# Patient Record
Sex: FEMALE | Race: WHITE | Employment: UNEMPLOYED | URBAN - METROPOLITAN AREA
[De-identification: names, ages, dates, MRNs, and addresses within clinical notes are randomized per-mention and may not be internally consistent; named-entity substitution may affect disease eponyms.]

---
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
## 2023-01-28 ENCOUNTER — HOSPITAL ENCOUNTER (EMERGENCY)
Facility: CLINIC | Age: 18
Discharge: HOME | End: 2023-01-28
Attending: EMERGENCY MEDICINE
Payer: MEDICARE

## 2023-01-28 VITALS
OXYGEN SATURATION: 100 % | BODY MASS INDEX: 17.66 KG/M2 | WEIGHT: 106 LBS | HEIGHT: 65 IN | SYSTOLIC BLOOD PRESSURE: 108 MMHG | TEMPERATURE: 98.6 F | HEART RATE: 77 BPM | DIASTOLIC BLOOD PRESSURE: 71 MMHG | RESPIRATION RATE: 16 BRPM

## 2023-01-28 DIAGNOSIS — J01.00 ACUTE NON-RECURRENT MAXILLARY SINUSITIS: Primary | ICD-10-CM

## 2023-01-28 PROCEDURE — 99283 EMERGENCY DEPT VISIT LOW MDM: CPT

## 2023-01-28 RX ORDER — FLUTICASONE PROPIONATE 50 MCG
1 SPRAY, SUSPENSION (ML) NASAL DAILY
Qty: 16 G | Refills: 0 | Status: SHIPPED | OUTPATIENT
Start: 2023-01-28

## 2023-01-28 RX ORDER — AMOXICILLIN 500 MG/1
500 CAPSULE ORAL 3 TIMES DAILY
Qty: 30 CAPSULE | Refills: 0 | Status: SHIPPED | OUTPATIENT
Start: 2023-01-28 | End: 2023-02-07

## 2023-01-28 ASSESSMENT — ENCOUNTER SYMPTOMS
NAUSEA: 0
TROUBLE SWALLOWING: 0
VOMITING: 0
SINUS PAIN: 1
SINUS PRESSURE: 1
SORE THROAT: 0
DIARRHEA: 0

## 2023-01-28 NOTE — DISCHARGE INSTRUCTIONS
Home.  Antibiotics as prescribed. Use Flonase and over-the-counter decongestant/ cough cold medications to help with symptoms.

## 2023-01-28 NOTE — ED PROVIDER NOTES
[address] [facility] ED  EMERGENCY DEPARTMENT ENCOUNTER      Pt Name: Vanessa Nicholson  MRN: [id number]  [place] 2005  Date of evaluation: 1/28/2023  Provider: Nathan Mathew [place] [facility]       Chief Complaint   Patient presents with    Eye Pain    Facial Pain         HISTORY OF PRESENT ILLNESS   (Location/Symptom, Timing/Onset, Context/Setting, Quality, Duration, Modifying Factors, Severity)  Note limiting factors. Vanessa Nicholson is a 21 y.o. female who presents to the emergency department for evaluation of sinus pain and pressure for approximately 10 days. Denies sore throat she is a little bit of cough. She also reports what she thought was possibly a stye underneath both of her eyes. States that it started out feeling like a bruise and is now a little bit tender to touch red raised area underneath both eyes. No vision changes. No fevers or chills        Nursing Notes were reviewed. REVIEW OF SYSTEMS    (2-9 systems for level 4, 10 or more for level 5)     Review of Systems   HENT:  Positive for sinus pressure and sinus pain. Negative for sore throat and trouble swallowing. Gastrointestinal:  Negative for diarrhea, nausea and vomiting. All other systems reviewed and are negative. Except as noted above the remainder of the review of systems was reviewed and negative.        PAST MEDICAL HISTORY     Past Medical History:   Diagnosis Date    Depression          SURGICAL HISTORY       Past Surgical History:   Procedure Laterality Date    HYMENECTOMY      TONSILLECTOMY           CURRENT MEDICATIONS       Discharge Medication List as of 1/28/2023  5:19 PM        CONTINUE these medications which have NOT CHANGED    Details   QUEtiapine (SEROQUEL) 25 MG tablet Take 25 mg by mouth dailyHistorical Med      buPROPion HCl (WELLBUTRIN PO) Take 150 mg by mouthHistorical Med      Loratadine (CLARITIN PO) Take by mouthHistorical Med             ALLERGIES     Nitrofurantoin and Sulfa antibiotics    FAMILY HISTORY     History reviewed. No pertinent family history. SOCIAL HISTORY       Social History     Socioeconomic History    Marital status: Single     Spouse name: None    Number of children: None    Years of education: None    Highest education level: None   Tobacco Use    Smoking status: Never     Passive exposure: Never    Smokeless tobacco: Never   Substance and Sexual Activity    Alcohol use: No    Drug use: No    Sexual activity: Never       SCREENINGS         [place] Coma Scale  Eye Opening: Spontaneous  Best Verbal Response: Oriented  Best Motor Response: Obeys commands  [place] Coma Scale Score: 15                     CIWA Assessment  BP: 108/71  Heart Rate: 77                 PHYSICAL EXAM    (up to 7 for level 4, 8 or more for level 5)     ED Triage Vitals [01/28/23 1648]   BP Temp Temp Source Heart Rate Resp SpO2 Height Weight - Scale   108/71 98.6 °F (37 °C) Oral 77 16 100 % 5' 5\" (1.651 m) 106 lb (48.1 kg)       Physical Exam  Vitals and nursing note reviewed. Constitutional:       General: She is not in acute distress. Appearance: Normal appearance. She is not toxic-appearing. HENT:      Head: Normocephalic and atraumatic. Right Ear: Tympanic membrane and external ear normal.      Left Ear: Tympanic membrane and external ear normal.      Nose: Congestion present. Mouth/Throat:      Mouth: Mucous membranes are moist.      Pharynx: Oropharynx is clear. No oropharyngeal exudate or posterior oropharyngeal erythema. Eyes:      General: Lids are normal.         Right eye: No discharge or hordeolum. Left eye: No discharge or hordeolum. Extraocular Movements: Extraocular movements intact. Right eye: Normal extraocular motion and no nystagmus. Left eye: Normal extraocular motion and no nystagmus.       Conjunctiva/sclera: Conjunctivae normal.        Comments: Two small red raised areas appearing almost as a pimple or molluscum underneath each eye. Cardiovascular:      Rate and Rhythm: Normal rate and regular rhythm. Pulses: Normal pulses. Heart sounds: Normal heart sounds. No murmur heard. Pulmonary:      Effort: Pulmonary effort is normal. No respiratory distress. Breath sounds: Normal breath sounds. Abdominal:      General: Abdomen is flat. There is no distension. Palpations: Abdomen is soft. There is no mass (no pulsitile mass). Tenderness: There is no abdominal tenderness. Musculoskeletal:         General: No swelling or tenderness. Normal range of motion. Cervical back: Normal range of motion and neck supple. No rigidity or tenderness. Skin:     General: Skin is warm and dry. Capillary Refill: Capillary refill takes less than 2 seconds. Neurological:      General: No focal deficit present. Mental Status: She is alert and oriented to person, place, and time. Psychiatric:         Mood and Affect: Mood normal.       DIAGNOSTIC RESULTS     EKG: All EKG's are interpreted by the Emergency Department Physician who either signs or Co-signs this chart in the absence of a cardiologist.        RADIOLOGY:   Non-plain film images such as CT, Ultrasound and MRI are read by the radiologist. Plain radiographic images are visualized and preliminarily interpreted by the emergency physician with the below findings:      Interpretation per the Radiologist below, if available at the time of this note:    No orders to display         ED BEDSIDE ULTRASOUND:   Performed by ED Physician - none    LABS:  Labs Reviewed - No data to display    All other labs were within normal range or not returned as of this dictation.     EMERGENCY DEPARTMENT COURSE and DIFFERENTIAL DIAGNOSIS/MDM:   Vitals:    Vitals:    01/28/23 1648   BP: 108/71   Pulse: 77   Resp: 16   Temp: 98.6 °F (37 °C)   TempSrc: Oral   SpO2: 100%   Weight: 48.1 kg (106 lb)   Height: 5' 5\" (1.651 m)           Medical Decision Making  Risk  Prescription drug management. Given the duration of her sinus symptoms we will treat with amoxicillin. She is instructed to follow-up with primary care physician. I have low suspicion for a stye. I do feel that what she is experiencing is more of a viral molluscum or a pimple. We discussed follow-up with primary care physician what to watch out for and when to return. She acknowledges understanding and is agreeable to the discharge plan of care. There is no periorbital erythema or edema present. REASSESSMENT          CRITICAL CARE TIME     CONSULTS:  None    PROCEDURES:  Unless otherwise noted below, none     Procedures      FINAL IMPRESSION      1. Acute non-recurrent maxillary sinusitis          DISPOSITION/PLAN   DISPOSITION Decision To Discharge 01/28/2023 05:14:21 PM      PATIENT REFERRED TO:  Luisito Garduno  [address]  [address] [place] [id number]    In 2 days      [facility]  [facility] [place]   [phone]    If symptoms worsen      DISCHARGE MEDICATIONS:  Discharge Medication List as of 1/28/2023  5:19 PM        START taking these medications    Details   amoxicillin (AMOXIL) 500 MG capsule Take 1 capsule by mouth 3 times daily for 10 days, Disp-30 capsule, R-0Normal      fluticasone (FLONASE) 50 MCG/ACT nasal spray 1 spray by Each Nostril route daily, Disp-16 g, R-0Normal           Controlled Substances Monitoring:     No flowsheet data found.     (Please note that portions of this note were completed with a voice recognition program.  Efforts were made to edit the dictations but occasionally words are mis-transcribed.)    JARRETT Booker CNP (electronically signed)  Attending Emergency Physician           JARRETT Booker CNP  01/28/23 5865

## 2023-01-28 NOTE — ED PROVIDER NOTES
I was present in the ED during this patient's evaluation and management by the Advance Practice Provider and was available to address any concerns about their medical management.     Jenny Jackson MD  Attending, Emergency Department       Tosha Vizcarra MD  01/28/23 4683

## 2023-03-02 ENCOUNTER — HOSPITAL ENCOUNTER (EMERGENCY)
Facility: CLINIC | Age: 18
Discharge: HOME | End: 2023-03-02
Attending: STUDENT IN AN ORGANIZED HEALTH CARE EDUCATION/TRAINING PROGRAM
Payer: MEDICAID

## 2023-03-02 ENCOUNTER — APPOINTMENT (OUTPATIENT)
Dept: CT IMAGING | Facility: CLINIC | Age: 18
End: 2023-03-02
Payer: MEDICAID

## 2023-03-02 VITALS
WEIGHT: 107 LBS | HEART RATE: 89 BPM | OXYGEN SATURATION: 99 % | HEIGHT: 65 IN | RESPIRATION RATE: 16 BRPM | BODY MASS INDEX: 17.83 KG/M2 | DIASTOLIC BLOOD PRESSURE: 93 MMHG | SYSTOLIC BLOOD PRESSURE: 133 MMHG | TEMPERATURE: 98.4 F

## 2023-03-02 DIAGNOSIS — R10.31 ABDOMINAL PAIN, RIGHT LOWER QUADRANT: Primary | ICD-10-CM

## 2023-03-02 DIAGNOSIS — R11.0 NAUSEA: ICD-10-CM

## 2023-03-02 LAB
ABSOLUTE EOS #: 0 K/UL (ref 0–0.4)
ABSOLUTE LYMPH #: 2.5 K/UL (ref 1.2–5.2)
ABSOLUTE MONO #: 0.4 K/UL (ref 0.1–1.4)
ALBUMIN SERPL-MCNC: 4.4 G/DL (ref 3.5–5.2)
ALBUMIN/GLOBULIN RATIO: 1.6 (ref 1–2.5)
ALP SERPL-CCNC: 75 U/L (ref 35–104)
ALT SERPL-CCNC: 14 U/L (ref 5–33)
ANION GAP SERPL CALCULATED.3IONS-SCNC: 9 MMOL/L (ref 9–17)
AST SERPL-CCNC: 19 U/L
BACTERIA: ABNORMAL
BASOPHILS # BLD: 1 % (ref 0–2)
BASOPHILS ABSOLUTE: 0 K/UL (ref 0–0.2)
BILIRUB SERPL-MCNC: 0.2 MG/DL (ref 0.3–1.2)
BILIRUBIN URINE: NEGATIVE
BUN SERPL-MCNC: 7 MG/DL (ref 6–20)
CALCIUM SERPL-MCNC: 9.1 MG/DL (ref 8.6–10.4)
CHLORIDE SERPL-SCNC: 105 MMOL/L (ref 98–107)
CO2 SERPL-SCNC: 25 MMOL/L (ref 20–31)
COLOR: YELLOW
CREAT SERPL-MCNC: 0.5 MG/DL (ref 0.5–0.9)
EOSINOPHILS RELATIVE PERCENT: 1 % (ref 1–4)
EPITHELIAL CELLS UA: ABNORMAL /HPF (ref 0–5)
GFR SERPL CREATININE-BSD FRML MDRD: >60 ML/MIN/1.73M2
GLUCOSE SERPL-MCNC: 104 MG/DL (ref 70–99)
GLUCOSE UR STRIP.AUTO-MCNC: NEGATIVE MG/DL
HCG QUALITATIVE: NEGATIVE
HCT VFR BLD AUTO: 36.3 % (ref 36–46)
HGB BLD-MCNC: 12 G/DL (ref 12–16)
KETONES UR STRIP.AUTO-MCNC: NEGATIVE MG/DL
LEUKOCYTE ESTERASE UR QL STRIP.AUTO: NEGATIVE
LYMPHOCYTES # BLD: 44 % (ref 25–45)
MCH RBC QN AUTO: 26.4 PG (ref 26–34)
MCHC RBC AUTO-ENTMCNC: 33.1 G/DL (ref 31–37)
MCV RBC AUTO: 79.7 FL (ref 80–100)
MONOCYTES # BLD: 7 % (ref 2–8)
MUCUS: ABNORMAL
NITRITE UR QL STRIP.AUTO: NEGATIVE
OTHER OBSERVATIONS UA: ABNORMAL
PDW BLD-RTO: 15 % (ref 12.5–15.4)
PLATELET # BLD AUTO: 295 K/UL (ref 140–450)
PMV BLD AUTO: 7.3 FL (ref 6–12)
POTASSIUM SERPL-SCNC: 3.9 MMOL/L (ref 3.7–5.3)
PROT SERPL-MCNC: 7.1 G/DL (ref 6.4–8.3)
PROT UR STRIP.AUTO-MCNC: 6 MG/DL (ref 5–8)
PROT UR STRIP.AUTO-MCNC: ABNORMAL MG/DL
RBC # BLD: 4.55 M/UL (ref 4–5.2)
RBC CLUMPS #/AREA URNS AUTO: ABNORMAL /HPF (ref 0–2)
SEG NEUTROPHILS: 47 % (ref 34–64)
SEGMENTED NEUTROPHILS ABSOLUTE COUNT: 2.8 K/UL (ref 1.8–8)
SODIUM SERPL-SCNC: 139 MMOL/L (ref 135–144)
SPECIFIC GRAVITY UA: 1.02 (ref 1–1.03)
TURBIDITY: CLEAR
URINE HGB: NEGATIVE
UROBILINOGEN, URINE: NORMAL
WBC # BLD AUTO: 5.7 K/UL (ref 4.5–13.5)
WBC UA: ABNORMAL /HPF (ref 0–5)

## 2023-03-02 PROCEDURE — 85025 COMPLETE CBC W/AUTO DIFF WBC: CPT

## 2023-03-02 PROCEDURE — 6360000002 HC RX W HCPCS

## 2023-03-02 PROCEDURE — 99285 EMERGENCY DEPT VISIT HI MDM: CPT

## 2023-03-02 PROCEDURE — 84703 CHORIONIC GONADOTROPIN ASSAY: CPT

## 2023-03-02 PROCEDURE — 80053 COMPREHEN METABOLIC PANEL: CPT

## 2023-03-02 PROCEDURE — 96374 THER/PROPH/DIAG INJ IV PUSH: CPT

## 2023-03-02 PROCEDURE — 6360000002 HC RX W HCPCS: Performed by: STUDENT IN AN ORGANIZED HEALTH CARE EDUCATION/TRAINING PROGRAM

## 2023-03-02 PROCEDURE — 74177 CT ABD & PELVIS W/CONTRAST: CPT

## 2023-03-02 PROCEDURE — 81001 URINALYSIS AUTO W/SCOPE: CPT

## 2023-03-02 PROCEDURE — 6370000000 HC RX 637 (ALT 250 FOR IP)

## 2023-03-02 PROCEDURE — 36415 COLL VENOUS BLD VENIPUNCTURE: CPT

## 2023-03-02 PROCEDURE — 96375 TX/PRO/DX INJ NEW DRUG ADDON: CPT

## 2023-03-02 PROCEDURE — 6360000004 HC RX CONTRAST MEDICATION

## 2023-03-02 PROCEDURE — 2580000003 HC RX 258

## 2023-03-02 RX ORDER — SODIUM CHLORIDE 0.9 % (FLUSH) 0.9 %
10 SYRINGE (ML) INJECTION PRN
Status: DISCONTINUED | OUTPATIENT
Start: 2023-03-02 | End: 2023-03-03 | Stop reason: HOSPADM

## 2023-03-02 RX ORDER — DICYCLOMINE HYDROCHLORIDE 10 MG/1
10 CAPSULE ORAL
Qty: 120 CAPSULE | Refills: 0 | Status: SHIPPED | OUTPATIENT
Start: 2023-03-02 | End: 2023-04-01

## 2023-03-02 RX ORDER — KETOROLAC TROMETHAMINE 30 MG/ML
30 INJECTION, SOLUTION INTRAMUSCULAR; INTRAVENOUS ONCE
Status: COMPLETED | OUTPATIENT
Start: 2023-03-02 | End: 2023-03-02

## 2023-03-02 RX ORDER — ONDANSETRON 4 MG/1
4 TABLET, FILM COATED ORAL EVERY 8 HOURS PRN
Qty: 15 TABLET | Refills: 0 | Status: SHIPPED | OUTPATIENT
Start: 2023-03-02 | End: 2023-03-07

## 2023-03-02 RX ORDER — 0.9 % SODIUM CHLORIDE 0.9 %
70 INTRAVENOUS SOLUTION INTRAVENOUS ONCE
Status: COMPLETED | OUTPATIENT
Start: 2023-03-02 | End: 2023-03-02

## 2023-03-02 RX ORDER — DOCUSATE SODIUM 100 MG/1
100 CAPSULE, LIQUID FILLED ORAL 2 TIMES DAILY
Qty: 60 CAPSULE | Refills: 0 | Status: SHIPPED | OUTPATIENT
Start: 2023-03-02 | End: 2023-04-01

## 2023-03-02 RX ORDER — DICYCLOMINE HYDROCHLORIDE 10 MG/1
10 CAPSULE ORAL ONCE
Status: COMPLETED | OUTPATIENT
Start: 2023-03-02 | End: 2023-03-02

## 2023-03-02 RX ORDER — ONDANSETRON 2 MG/ML
4 INJECTION INTRAMUSCULAR; INTRAVENOUS ONCE
Status: COMPLETED | OUTPATIENT
Start: 2023-03-02 | End: 2023-03-02

## 2023-03-02 RX ADMIN — IOPAMIDOL 75 ML: 755 INJECTION, SOLUTION INTRAVENOUS at 20:28

## 2023-03-02 RX ADMIN — DICYCLOMINE HYDROCHLORIDE 10 MG: 10 CAPSULE ORAL at 22:08

## 2023-03-02 RX ADMIN — KETOROLAC TROMETHAMINE 30 MG: 30 INJECTION, SOLUTION INTRAMUSCULAR; INTRAVENOUS at 20:58

## 2023-03-02 RX ADMIN — SODIUM CHLORIDE, PRESERVATIVE FREE 10 ML: 5 INJECTION INTRAVENOUS at 20:28

## 2023-03-02 RX ADMIN — ONDANSETRON 4 MG: 2 INJECTION INTRAMUSCULAR; INTRAVENOUS at 19:40

## 2023-03-02 RX ADMIN — SODIUM CHLORIDE 70 ML: 9 INJECTION, SOLUTION INTRAVENOUS at 20:11

## 2023-03-02 ASSESSMENT — ENCOUNTER SYMPTOMS
SINUS PRESSURE: 0
SHORTNESS OF BREATH: 0
BACK PAIN: 0
DIARRHEA: 0
RHINORRHEA: 0
CONSTIPATION: 0
NAUSEA: 1
CHEST TIGHTNESS: 0
SORE THROAT: 0
SINUS PAIN: 0
VOMITING: 0
COUGH: 0
ABDOMINAL PAIN: 1

## 2023-03-02 ASSESSMENT — PAIN SCALES - GENERAL: PAINLEVEL_OUTOF10: 7

## 2023-03-02 ASSESSMENT — PAIN DESCRIPTION - LOCATION: LOCATION: ABDOMEN

## 2023-03-02 ASSESSMENT — PAIN DESCRIPTION - ORIENTATION: ORIENTATION: RIGHT

## 2023-03-03 NOTE — ED TRIAGE NOTES
Patient state she has had abdominal pain since December that worsened 1 week ago. Patient states she was seen at PCP around 11 and dx with abdominal pain with follow up to ED if symptoms worsen or to follow up with urology.   Patient states pain 6/10

## 2023-03-03 NOTE — ED PROVIDER NOTES
[facility]  [facility]  Phone: [phone]        Pt Name: Doris Sol  MRN: [id number]  [place] 2005  Date of evaluation: 3/2/23    [address] [place] [address]       Chief Complaint   Patient presents with    Abdominal Pain       HISTORY OF PRESENT ILLNESS (Location/Symptom, Timing/Onset, Context/Setting, Quality, Duration, Modifying Factors, Severity)      Doris Sol is a 21 y.o. female with no pertinent PMH who presents to the ED via private auto with complaint of right lower quadrant pain, nausea for the last 3 to 4 days intermittently. Patient states she was seen at her primary care physician's today for urinary urgency, had a urinalysis performed that was normal.  Patient denies any hematuria, flank pain, fever or chills, vomiting or diarrhea. Patient rates her abdominal pain a 6 out of 10, did not take anything for pain prior to arrival.  Patient has no concern for pregnancy. She denies any vaginal discharge, pain or odor. PAST MEDICAL / SURGICAL / SOCIAL / FAMILY HISTORY     PMH:  has a past medical history of Depression. Surgical History:  has a past surgical history that includes Hymenectomy and Tonsillectomy. Social History:  reports that she has never smoked. She has never been exposed to tobacco smoke. She has never used smokeless tobacco. She reports that she does not drink alcohol and does not use drugs. Family History: has no family status information on file. family history is not on file. Psychiatric History: None    Allergies: Nitrofurantoin and Sulfa antibiotics    Home Medications:   Prior to Admission medications    Medication Sig Start Date End Date Taking?  Authorizing Provider   fluticasone (FLONASE) 50 MCG/ACT nasal spray 1 spray by Each Nostril route daily 1/28/23   Xin Barry, APRN - CNP   QUEtiapine (SEROQUEL) 25 MG tablet Take 25 mg by mouth daily    Historical Provider, MD   buPROPion HCl (WELLBUTRIN PO) Take 150 mg by mouth    Historical Provider, MD   Loratadine (CLARITIN PO) Take by mouth    Historical Provider, MD       REVIEW OF SYSTEMS  (2-9 systems for level 4, 10 ormore for level 5)      Review of Systems   Constitutional:  Positive for appetite change. Negative for activity change, chills, diaphoresis, fatigue and fever. HENT:  Negative for congestion, ear pain, postnasal drip, rhinorrhea, sinus pressure, sinus pain and sore throat. Respiratory:  Negative for cough, chest tightness and shortness of breath. Cardiovascular:  Negative for chest pain, palpitations and leg swelling. Gastrointestinal:  Positive for abdominal pain (rlq) and nausea. Negative for constipation, diarrhea and vomiting. Genitourinary:  Positive for urgency. Negative for difficulty urinating, dysuria, flank pain, frequency, hematuria, vaginal bleeding, vaginal discharge and vaginal pain. Musculoskeletal:  Negative for arthralgias, back pain, joint swelling, myalgias and neck pain. Skin:  Negative for pallor and rash. Neurological:  Negative for dizziness, weakness, light-headedness, numbness and headaches. All other systems negative except as marked. PHYSICAL EXAM  (up to 7 for level 4, 8 or more for level 5)      INITIAL VITALS:  height is 5' 5\" (1.651 m) and weight is 48.5 kg (107 lb). Her oral temperature is 98.4 °F (36.9 °C). Her blood pressure is 133/93 (abnormal) and her pulse is 89. Her respiration is 16 and oxygen saturation is 99%. Vital signs reviewed. Physical Exam  Vitals and nursing note reviewed. Constitutional:       General: She is not in acute distress. Appearance: Normal appearance. She is normal weight. She is not ill-appearing, toxic-appearing or diaphoretic. HENT:      Head: Normocephalic and atraumatic. Right Ear: External ear normal.      Left Ear: External ear normal.      Nose: Nose normal. No congestion or rhinorrhea.       Mouth/Throat:      Mouth: Mucous membranes are moist. Pharynx: Oropharynx is clear. No pharyngeal swelling or oropharyngeal exudate. Eyes:      General: No scleral icterus. Right eye: No discharge. Left eye: No discharge. Extraocular Movements: Extraocular movements intact. Pupils: Pupils are equal, round, and reactive to light. Cardiovascular:      Rate and Rhythm: Normal rate and regular rhythm. Pulses: Normal pulses. Heart sounds: Normal heart sounds. No murmur heard. Pulmonary:      Effort: Pulmonary effort is normal. No respiratory distress. Breath sounds: Normal breath sounds. No stridor. No wheezing, rhonchi or rales. Chest:      Chest wall: No tenderness. Abdominal:      General: Abdomen is flat. There is no distension. Palpations: Abdomen is soft. Tenderness: There is abdominal tenderness in the right lower quadrant. There is no right CVA tenderness, left CVA tenderness, guarding or rebound. Positive signs include McBurney's sign. Negative signs include Henry's sign. Hernia: No hernia is present. Musculoskeletal:         General: No deformity or signs of injury. Normal range of motion. Cervical back: Normal range of motion and neck supple. Right lower leg: No edema. Left lower leg: No edema. Lymphadenopathy:      Cervical: No cervical adenopathy. Skin:     General: Skin is warm and dry. Capillary Refill: Capillary refill takes less than 2 seconds. Coloration: Skin is not jaundiced or pale. Findings: No rash. Neurological:      General: No focal deficit present. Mental Status: She is alert and oriented to person, place, and time. Cranial Nerves: No cranial nerve deficit. Sensory: No sensory deficit. Motor: No weakness. Gait: Gait normal.   Psychiatric:         Mood and Affect: Mood normal.         Behavior: Behavior normal.         Thought Content:  Thought content normal.     DIFFERENTIAL DIAGNOSIS / MDM   Patient presents to the emergency department with complaint described above. Right lower quadrant pain for 3 to 4 days, nausea and inappetence. Patient reports subjective fever at home. On exam she reports tenderness to her right lower quadrant, denies any CVA tenderness. Patient reports urinary urgency, she denies any frequency, hematuria or foul odor. Patient had a urinalysis performed at primary care today that was negative for acute infection. Patient was referred to urology for this problem. No recent antibiotic use. Ddx: appendicitis,     Patient is afebrile, not tachycardic, vital signs are stable. -Plan for IV, CBC, CMP, serum hCG. CT abdomen pelvis with contrast, IV Zofran.    -Urinalysis and Toradol ordered by Dr. Jessica Bills.    -Patient's blood work, imaging and urinalysis are unremarkable. Patient instructed to return immediately for reevaluation with any new or worsening symptoms. Plan is to treat symptomatic symptoms for suspected enteritis, patient does report having constipation and I will prescribe Colace for this. Patient discharged home with Zofran and Bentyl for nausea and abdominal cramping. Patient again denies any concern for STI, denies any vaginal complaints on initial exam.    Ddx: Appendicitis, enteritis    PLAN (Yeyo Chan / Homer Long / EKG):  Orders Placed This Encounter   Procedures    CT ABDOMEN PELVIS W IV CONTRAST Additional Contrast? None    CMP    CBC with Auto Differential    HCG Qualitative, Serum    Urinalysis with Reflex to Culture    Microscopic Urinalysis    Saline lock IV       MEDICATIONS ORDERED:  Orders Placed This Encounter   Medications    ondansetron (ZOFRAN) injection 4 mg    iopamidol (ISOVUE-370) 76 % injection 75 mL    0.9 % sodium chloride bolus    sodium chloride flush 0.9 % injection 10 mL    ketorolac (TORADOL) injection 30 mg     DIAGNOSTIC RESULTS       RADIOLOGY: All images are read by the radiologist and their interpretations are reviewed.   CT ABDOMEN PELVIS W IV CONTRAST Additional Contrast? None    Result Date: 3/2/2023  EXAMINATION: CT OF THE ABDOMEN AND PELVIS WITH CONTRAST 3/2/2023 8:03 pm TECHNIQUE: CT of the abdomen and pelvis was performed with the administration of intravenous contrast. Multiplanar reformatted images are provided for review. Automated exposure control, iterative reconstruction, and/or weight based adjustment of the mA/kV was utilized to reduce the radiation dose to as low as reasonably achievable. COMPARISON: None. HISTORY: ORDERING SYSTEM PROVIDED HISTORY: RLQ pain, nausea TECHNOLOGIST PROVIDED HISTORY: RLQ pain, nausea Decision Support Exception - unselect if not a suspected or confirmed emergency medical condition->Emergency Medical Condition (MA) Reason for Exam: Pt. C/o right sided abdominal pain for several months that worsened this past week. She also has c/o nausea. FINDINGS: LOWER CHEST:  Visualized portion of the lower chest demonstrates no acute abnormality. KIDNEYS AND URINARY TRACT: No renal calculi are identified. There is no evidence for hydronephrosis. The ureters are of normal course and caliber. Simple cyst of midpole right kidney. ORGANS: Visualized portions of the liver, spleen, pancreas, gallbladder, and adrenal glands demonstrate no acute abnormality. GI/BOWEL: No bowel obstruction. Appendix is not clearly visualized however there are no signs of acute appendicitis. PELVIS: The bladder and pelvic organs are unremarkable. PERITONEUM/RETROPERITONEUM: No free air or free fluid is noted. No pathologically enlarged lymphadenopathy. The vasculature do not demonstrate acute abnormality. BONES/SOFT TISSUES: The osseous structures demonstrate no acute abnormality. Appendix is not clearly visualized however there are no signs of acute appendicitis. No obstructive uropathy.         LABS:  Results for orders placed or performed during the hospital encounter of 03/02/23   CMP   Result Value Ref Range    Glucose 104 (H) 70 - 99 mg/dL    BUN 7 6 - 20 mg/dL    Creatinine 0.50 0.50 - 0.90 mg/dL    Est, Glom Filt Rate >60 >60 mL/min/1.73m2    Calcium 9.1 8.6 - 10.4 mg/dL    Sodium 139 135 - 144 mmol/L    Potassium 3.9 3.7 - 5.3 mmol/L    Chloride 105 98 - 107 mmol/L    CO2 25 20 - 31 mmol/L    Anion Gap 9 9 - 17 mmol/L    Alkaline Phosphatase 75 35 - 104 U/L    ALT 14 5 - 33 U/L    AST 19 <32 U/L    Total Bilirubin 0.2 (L) 0.3 - 1.2 mg/dL    Total Protein 7.1 6.4 - 8.3 g/dL    Albumin 4.4 3.5 - 5.2 g/dL    Albumin/Globulin Ratio 1.6 1.0 - 2.5   CBC with Auto Differential   Result Value Ref Range    WBC 5.7 4.5 - 13.5 k/uL    RBC 4.55 4.0 - 5.2 m/uL    Hemoglobin 12.0 12.0 - 16.0 g/dL    Hematocrit 36.3 36 - 46 %    MCV 79.7 (L) 80 - 100 fL    MCH 26.4 26 - 34 pg    MCHC 33.1 31 - 37 g/dL    RDW 15.0 12.5 - 15.4 %    Platelets 808 964 - 706 k/uL    MPV 7.3 6.0 - 12.0 fL    Seg Neutrophils 47 34 - 64 %    Lymphocytes 44 25 - 45 %    Monocytes 7 2 - 8 %    Eosinophils % 1 1 - 4 %    Basophils 1 0 - 2 %    Segs Absolute 2.80 1.8 - 8.0 k/uL    Absolute Lymph # 2.50 1.2 - 5.2 k/uL    Absolute Mono # 0.40 0.1 - 1.4 k/uL    Absolute Eos # 0.00 0.0 - 0.4 k/uL    Basophils Absolute 0.00 0.0 - 0.2 k/uL   HCG Qualitative, Serum   Result Value Ref Range    hCG Qual NEGATIVE NEGATIVE   Urinalysis with Reflex to Culture    Specimen: Urine   Result Value Ref Range    Color, UA Yellow Yellow    Turbidity UA Clear Clear    Glucose, Ur NEGATIVE NEGATIVE    Bilirubin Urine NEGATIVE NEGATIVE    Ketones, Urine NEGATIVE NEGATIVE    Specific Gravity, UA 1.024 1.005 - 1.030    Urine Hgb NEGATIVE NEGATIVE    pH, UA 6.0 5.0 - 8.0    Protein, UA 1+ (A) NEGATIVE    Urobilinogen, Urine Normal Normal    Nitrite, Urine NEGATIVE NEGATIVE    Leukocyte Esterase, Urine NEGATIVE NEGATIVE   Microscopic Urinalysis   Result Value Ref Range    WBC, UA 0 TO 2 0 - 5 /HPF    RBC, UA 0 TO 2 0 - 2 /HPF    Epithelial Cells UA 2 TO 5 0 - 5 /HPF    Bacteria, UA FEW (A) None    Mucus, UA 1+ (A) None Other Observations UA (A) NOT REQ. Utilizing a urinalysis as the only screening method to exclude a potential uropathogen can be unreliable in many patient populations. Rapid screening tests are less sensitive than culture and if UTI is a clinical possibility, culture should be considered despite a negative urinalysis. Carmen DIAZ Moncada [address] COURSE     ED Course as of 03/02/23 2152   Thu Mar 02, 2023   2051 Patient states her nausea has improved, patient's mother at bedside at this time. Awaiting CT abdomen results [AJ]      ED Course User Index  [AJ] Lucina Josueblanca, APRN - CNP        Vitals:    Vitals:    03/02/23 1904   BP: (!) 133/93   Pulse: 89   Resp: 16   Temp: 98.4 °F (36.9 °C)   TempSrc: Oral   SpO2: 99%   Weight: 48.5 kg (107 lb)   Height: 5' 5\" (1.651 m)     -------------------------  BP: (!) 133/93, Temp: 98.4 °F (36.9 °C), Heart Rate: 89, Resp: 16      RE-EVALUATION:  See ED Course notes above. DISCHARGE PLANNING:    -Patient instructed to follow-up with her primary care provider tomorrow and follow the advice of her primary care provider that she received about contacting urologist for appointment. Patient discharged home with prescription for Zofran, Bentyl and Colace. Patient instructed to return immediately with any new or worsening symptoms, fever or chills, nausea with vomiting and diarrhea that is uncontrolled by medications prescribed tonight. Patient instructed to return immediately for reevaluation with any worsening abdominal pain, dizziness, shortness of breath or chest pain. Patient's mom is at bedside, I discussed today's course of treatment and findings with both the patient and her mother and all their questions and concerns were addressed by myself and attending physician, Dr. CR[facility]. The patient appears non-toxic and well hydrated. There are no signs of life threatening or serious infection at this time.  The mother has been instructed to return if the patient appears to be getting more seriously ill in any way. The mother and patient was instructed to have the patient follow up with the patient's primary care provider within an appropriate timeframe. At this time the patient is without objective evidence of an acute process requiring hospitalization or inpatient management. They have remained hemodynamically stable throughout their entire ED visit and are stable for discharge with outpatient follow-up. The patient and mother understand that at this time there is no evidence for a more malignant underlying process, but the parents/guardian also understands that early in the process of an illness or injury, an emergency department workup can be falsely reassuring. Routine discharge counseling was given, and the parents/guardian understands that worsening, changing or persistent symptoms should prompt an immediate call or follow up with their primary physician or return to the emergency department. The importance of appropriate follow up was also discussed. I have reviewed the disposition diagnosis with the patient and or their family/guardian. I have answered their questions and given discharge instructions. They voiced understanding of these instructions and did not have any further questions or complaints. I have reviewed the disposition diagnosis with the patient and or their family/guardian. I have answered their questions and given discharge instructions. They voiced understanding of these instructions and did not have any further questions or complaints. This patient was seen by the attending physician and they agreed with the assessment and plan. CONSULTS:  None    PROCEDURES:  None    FINAL IMPRESSION      1. Abdominal pain, right lower quadrant    2. Nausea          DISPOSITION / PLAN     CONDITION ON DISPOSITION:   Good / Stable for discharge.      PATIENT REFERRED TO:  Pattie Valadez  [address] [place] [address], [address]  [address] [place] [id number]  [phone]    Schedule an appointment as soon as possible for a visit   If symptoms worsen, As needed    [facility]  [address] [phone]  Go to   New or worsening symptoms    DISCHARGE MEDICATIONS:  New Prescriptions    No medications on file       JARRETT Lewis - [address] [facility]   Emergency Medicine Nurse Practitioner    (Please note that portions of this note were completed with a voice recognition program.  Efforts were made to edit the dictations but occasionally words aremis-transcribed.)       JARRETT Lewis CNP  03/02/23 1970

## 2023-03-03 NOTE — DISCHARGE INSTRUCTIONS
Please call your primary care physician and discuss your ER visit today, they may want to see you in the office for evaluation. Based on your doctor appointment today with your primary care provider, there was a recommendation to follow-up with urology and I would encourage this as you have had no evidence of a bacterial infection that would cause your urinary urgency. Avoid eating any spicy food, milk type products or drinks that have caffeine in it. Take all medications as prescribed. For pain use ibuprofen (Motrin) or acetaminophen (Tylenol), unless prescribed medications that have acetaminophen in it. You can take over the counter acetaminophen tablets (1 - 2 tablets of the 500-mg strength every 6 hours) or ibuprofen tablets (2 tablets every 4 hours). PLEASE RETURN TO THE EMERGENCY DEPARTMENT IMMEDIATELY for worsening symptoms, or if you develop any concerning symptoms such as: high fever not relieved by acetaminophen (Tylenol) and/or ibuprofen (Motrin), chills, shortness of breath, chest pain, persistent nausea and/or vomiting, numbness, weakness or tingling in the arms or legs or change in color of the extremities, changes in mental status, persistent headache, blurry vision. Return within 8 - 12 hours if you have any of the following: worsening of pain in your abdomen, no food sounds good to you, you continue to vomit, pain goes to your back, have pain in the abdomen when going over a bump in the car or when you jump up and down, develop vaginal bleeding or discharge, inability to urinate, unable to follow up with your physician, or other any other care or concern.

## 2023-03-03 NOTE — ED PROVIDER NOTES
I performed a history and physical examination of the patient and discussed management with the mid level provideer. I reviewed the mid level provider's note and agree with the documented findings and plan of care. Any areas of disagreement are noted on the chart. I was personally present for the key portions of any procedures. I have documented in the chart those procedures where I was not present during the key portions. I have reviewed the emergency nurses triage note. I agree with the chief complaint, past medical history, past surgical history, allergies, medications, social and family history as documented unless otherwise noted below. Documentation of the HPI, Physical Exam and Medical Decision Making performed by medical students or scribes is based on my personal performance of the HPI, PE and MDM. For Physician Assistant/ Nurse Practitioner cases/documentation I have personally evaluated this patient and have completed at least one if not all key elements of the E/M (history, physical exam, and MDM). Additional findings are as noted. Patient is a healthy 64-year-old female presenting with right lower quadrant abdominal pain and nausea which has been gradual in onset. Sharp stabbing pain without radiation. On exam vitals normal patient is in no acute distress. Heart lung sounds normal and clear, abdomen soft with tenderness in the right lower quadrant with voluntary guarding. Patient denies any urinary symptoms, abnormal vaginal bleeding, vaginal discharge, concern for STD exposure. She is sexually active and states that she was tested for STDs recently and was negative. Abdominal labs, pregnancy, urinalysis, CT scan of the abdomen pelvis with contrast was obtained. All work-up was unremarkable. No leukocytosis. CT scan shows no evidence of acute appendicitis or abnormalities in the ovaries.   I did notice moderate stool burden on CT scan as well as some mild inflammation of the small bowel wall which may be secondary to enteritis. For now, we will treat the patient for constipation and enteritis and discharged home with strict return precautions.     Jovon Aguilera DO  Emergency Medicine Physician  9:55 PM         Abraham Bradley DO  03/03/23 9448

## 2023-03-22 ENCOUNTER — HOSPITAL ENCOUNTER (EMERGENCY)
Facility: CLINIC | Age: 18
Discharge: HOME | End: 2023-03-22
Attending: EMERGENCY MEDICINE
Payer: MEDICAID

## 2023-03-22 VITALS
HEIGHT: 65 IN | HEART RATE: 85 BPM | SYSTOLIC BLOOD PRESSURE: 138 MMHG | WEIGHT: 106 LBS | BODY MASS INDEX: 17.66 KG/M2 | TEMPERATURE: 99.8 F | OXYGEN SATURATION: 100 % | DIASTOLIC BLOOD PRESSURE: 80 MMHG | RESPIRATION RATE: 16 BRPM

## 2023-03-22 DIAGNOSIS — R10.84 GENERALIZED ABDOMINAL PAIN: Primary | ICD-10-CM

## 2023-03-22 LAB
ABSOLUTE EOS #: 0 K/UL (ref 0–0.4)
ABSOLUTE LYMPH #: 2 K/UL (ref 1.2–5.2)
ABSOLUTE MONO #: 0.5 K/UL (ref 0.1–1.4)
ALBUMIN SERPL-MCNC: 4.3 G/DL (ref 3.5–5.2)
ALBUMIN/GLOBULIN RATIO: 1.8 (ref 1–2.5)
ALP SERPL-CCNC: 62 U/L (ref 35–104)
ALT SERPL-CCNC: 14 U/L (ref 5–33)
ANION GAP SERPL CALCULATED.3IONS-SCNC: 10 MMOL/L (ref 9–17)
AST SERPL-CCNC: 19 U/L
BACTERIA: ABNORMAL
BASOPHILS # BLD: 1 % (ref 0–2)
BASOPHILS ABSOLUTE: 0.1 K/UL (ref 0–0.2)
BILIRUB SERPL-MCNC: 0.2 MG/DL (ref 0.3–1.2)
BILIRUBIN URINE: NEGATIVE
BUN SERPL-MCNC: 7 MG/DL (ref 6–20)
CALCIUM SERPL-MCNC: 8.7 MG/DL (ref 8.6–10.4)
CHLORIDE SERPL-SCNC: 104 MMOL/L (ref 98–107)
CO2 SERPL-SCNC: 24 MMOL/L (ref 20–31)
COLOR: YELLOW
CREAT SERPL-MCNC: 0.6 MG/DL (ref 0.5–0.9)
EOSINOPHILS RELATIVE PERCENT: 1 % (ref 1–4)
EPITHELIAL CELLS UA: ABNORMAL /HPF (ref 0–5)
GFR SERPL CREATININE-BSD FRML MDRD: >60 ML/MIN/1.73M2
GLUCOSE SERPL-MCNC: 115 MG/DL (ref 70–99)
GLUCOSE UR STRIP.AUTO-MCNC: NEGATIVE MG/DL
HCG(URINE) PREGNANCY TEST: NEGATIVE
HCT VFR BLD AUTO: 34.9 % (ref 36–46)
HGB BLD-MCNC: 11.6 G/DL (ref 12–16)
KETONES UR STRIP.AUTO-MCNC: NEGATIVE MG/DL
LEUKOCYTE ESTERASE UR QL STRIP.AUTO: NEGATIVE
LIPASE SERPL-CCNC: 27 U/L (ref 13–60)
LYMPHOCYTES # BLD: 36 % (ref 25–45)
MCH RBC QN AUTO: 27.1 PG (ref 26–34)
MCHC RBC AUTO-ENTMCNC: 33.2 G/DL (ref 31–37)
MCV RBC AUTO: 81.4 FL (ref 80–100)
MONOCYTES # BLD: 10 % (ref 2–8)
MUCUS: ABNORMAL
NITRITE UR QL STRIP.AUTO: NEGATIVE
OTHER OBSERVATIONS UA: ABNORMAL
PDW BLD-RTO: 15.3 % (ref 12.5–15.4)
PLATELET # BLD AUTO: 209 K/UL (ref 140–450)
PMV BLD AUTO: 7.9 FL (ref 6–12)
POTASSIUM SERPL-SCNC: 3.7 MMOL/L (ref 3.7–5.3)
PROT SERPL-MCNC: 6.7 G/DL (ref 6.4–8.3)
PROT UR STRIP.AUTO-MCNC: 7 MG/DL (ref 5–8)
PROT UR STRIP.AUTO-MCNC: ABNORMAL MG/DL
RBC # BLD: 4.29 M/UL (ref 4–5.2)
RBC CLUMPS #/AREA URNS AUTO: ABNORMAL /HPF (ref 0–2)
SEG NEUTROPHILS: 52 % (ref 34–64)
SEGMENTED NEUTROPHILS ABSOLUTE COUNT: 2.9 K/UL (ref 1.8–8)
SODIUM SERPL-SCNC: 138 MMOL/L (ref 135–144)
SPECIFIC GRAVITY UA: 1.02 (ref 1–1.03)
TURBIDITY: CLEAR
URINE HGB: NEGATIVE
UROBILINOGEN, URINE: NORMAL
WBC # BLD AUTO: 5.5 K/UL (ref 4.5–13.5)
WBC UA: ABNORMAL /HPF (ref 0–5)

## 2023-03-22 PROCEDURE — 96375 TX/PRO/DX INJ NEW DRUG ADDON: CPT

## 2023-03-22 PROCEDURE — 81025 URINE PREGNANCY TEST: CPT

## 2023-03-22 PROCEDURE — 99284 EMERGENCY DEPT VISIT MOD MDM: CPT

## 2023-03-22 PROCEDURE — 81001 URINALYSIS AUTO W/SCOPE: CPT

## 2023-03-22 PROCEDURE — 83690 ASSAY OF LIPASE: CPT

## 2023-03-22 PROCEDURE — 80053 COMPREHEN METABOLIC PANEL: CPT

## 2023-03-22 PROCEDURE — 2580000003 HC RX 258: Performed by: REGISTERED NURSE

## 2023-03-22 PROCEDURE — 96374 THER/PROPH/DIAG INJ IV PUSH: CPT

## 2023-03-22 PROCEDURE — 36415 COLL VENOUS BLD VENIPUNCTURE: CPT

## 2023-03-22 PROCEDURE — 6360000002 HC RX W HCPCS: Performed by: REGISTERED NURSE

## 2023-03-22 PROCEDURE — 85025 COMPLETE CBC W/AUTO DIFF WBC: CPT

## 2023-03-22 RX ORDER — 0.9 % SODIUM CHLORIDE 0.9 %
1000 INTRAVENOUS SOLUTION INTRAVENOUS ONCE
Status: COMPLETED | OUTPATIENT
Start: 2023-03-22 | End: 2023-03-22

## 2023-03-22 RX ORDER — KETOROLAC TROMETHAMINE 30 MG/ML
30 INJECTION, SOLUTION INTRAMUSCULAR; INTRAVENOUS ONCE
Status: COMPLETED | OUTPATIENT
Start: 2023-03-22 | End: 2023-03-22

## 2023-03-22 RX ORDER — ONDANSETRON 2 MG/ML
4 INJECTION INTRAMUSCULAR; INTRAVENOUS ONCE
Status: COMPLETED | OUTPATIENT
Start: 2023-03-22 | End: 2023-03-22

## 2023-03-22 RX ADMIN — SODIUM CHLORIDE 1000 ML: 9 INJECTION, SOLUTION INTRAVENOUS at 21:30

## 2023-03-22 RX ADMIN — ONDANSETRON 4 MG: 2 INJECTION INTRAMUSCULAR; INTRAVENOUS at 21:31

## 2023-03-22 RX ADMIN — KETOROLAC TROMETHAMINE 30 MG: 30 INJECTION, SOLUTION INTRAMUSCULAR at 21:31

## 2023-03-22 ASSESSMENT — ENCOUNTER SYMPTOMS
BACK PAIN: 0
COUGH: 0
DIARRHEA: 0
VOMITING: 0
ABDOMINAL PAIN: 1
SHORTNESS OF BREATH: 0
NAUSEA: 1
BLOOD IN STOOL: 1

## 2023-03-22 ASSESSMENT — PAIN DESCRIPTION - ORIENTATION: ORIENTATION: LOWER

## 2023-03-22 ASSESSMENT — PAIN - FUNCTIONAL ASSESSMENT: PAIN_FUNCTIONAL_ASSESSMENT: 0-10

## 2023-03-22 ASSESSMENT — PAIN DESCRIPTION - LOCATION: LOCATION: ABDOMEN

## 2023-03-22 ASSESSMENT — PAIN SCALES - GENERAL: PAINLEVEL_OUTOF10: 7

## 2023-03-23 NOTE — ED PROVIDER NOTES
Attending Supervising Physician's Attestation Statement  I performed a history and physical examination on the patient and discussed the management with the nurse practitioner. I reviewed and agree with the findings and plan as documented in his note .     Patient complains of abdominal pain she was here about 3 weeks ago with a full work-up which was unremarkable at that time she was complaining of pain since December 1 states today she is here mostly because she had a small episode of blood in her stool yesterday small episode 2 days ago none today pain is unchanged  Abdomen shows mild diffuse tenderness with no rebound or guarding bowel sounds are normal  Laboratory results are unremarkable patient does not have a surgical abdomen based on history labs and exam at this time she will be discharged she was instructed follow-up with primary for GI consult return if worse  Condition discharge stable  Diagnosis abdominal pain    Electronically signed by Alaina Vaughan MD on 3/22/23 at 10:16 PM EDT       Alaina Vaughan MD  03/22/23 4122
Provider   dicyclomine (BENTYL) 10 MG capsule Take 1 capsule by mouth 4 times daily (before meals and nightly) 3/2/23 4/1/23  JARRETT Bright CNP   docusate sodium (COLACE) 100 MG capsule Take 1 capsule by mouth 2 times daily 3/2/23 4/1/23  JARRETT Brgiht CNP   fluticasone [facility]) 50 MCG/ACT nasal spray 1 spray by Each Nostril route daily 1/28/23   JARRETT Neumann CNP   QUEtiapine (SEROQUEL) 25 MG tablet Take 25 mg by mouth daily    Historical Provider, MD   buPROPion HCl (WELLBUTRIN PO) Take 150 mg by mouth    Historical Provider, MD   Loratadine (CLARITIN PO) Take by mouth    Historical Provider, MD       REVIEW OF SYSTEMS  (2-9 systems for level 4, 10 ormore for level 5)      Review of Systems   Constitutional:  Negative for chills and fever. Respiratory:  Negative for cough and shortness of breath. Cardiovascular:  Negative for chest pain and palpitations. Gastrointestinal:  Positive for abdominal pain, blood in stool and nausea. Negative for diarrhea and vomiting. Genitourinary:  Negative for dysuria, hematuria, vaginal bleeding, vaginal discharge and vaginal pain. Musculoskeletal:  Negative for back pain, neck pain and neck stiffness. Neurological:  Negative for dizziness and headaches. All other systems negative except as marked. PHYSICAL EXAM  (up to 7 for level 4, 8 or more for level 5)      INITIAL VITALS:  height is 5' 5\" (1.651 m) and weight is 48.1 kg (106 lb). Her oral temperature is 99.8 °F (37.7 °C). Her blood pressure is 138/80 and her pulse is 85. Her respiration is 16 and oxygen saturation is 100%. Vital signs reviewed. Physical Exam  Constitutional:       General: She is not in acute distress. Appearance: Normal appearance. She is not ill-appearing or toxic-appearing. HENT:      Head: Normocephalic and atraumatic. Neck:      Trachea: No tracheal deviation. Cardiovascular:      Rate and Rhythm: Normal rate and regular rhythm.

## 2023-05-15 ENCOUNTER — APPOINTMENT (OUTPATIENT)
Dept: CT IMAGING | Facility: CLINIC | Age: 18
End: 2023-05-15
Payer: MEDICAID

## 2023-05-15 ENCOUNTER — HOSPITAL ENCOUNTER (EMERGENCY)
Facility: CLINIC | Age: 18
Discharge: HOME | End: 2023-05-15
Attending: SPECIALIST
Payer: MEDICAID

## 2023-05-15 VITALS
SYSTOLIC BLOOD PRESSURE: 128 MMHG | DIASTOLIC BLOOD PRESSURE: 87 MMHG | WEIGHT: 106 LBS | TEMPERATURE: 98.6 F | OXYGEN SATURATION: 100 % | BODY MASS INDEX: 17.64 KG/M2 | HEART RATE: 81 BPM | RESPIRATION RATE: 16 BRPM

## 2023-05-15 DIAGNOSIS — R10.30 LOWER ABDOMINAL PAIN: Primary | ICD-10-CM

## 2023-05-15 LAB
ALBUMIN SERPL-MCNC: 4.2 G/DL (ref 3.5–5.2)
ALBUMIN/GLOB SERPL: 1.8 {RATIO} (ref 1–2.5)
ALP SERPL-CCNC: 55 U/L (ref 35–104)
ALT SERPL-CCNC: 13 U/L (ref 5–33)
AMORPH SED URNS QL MICRO: ABNORMAL
ANION GAP SERPL CALCULATED.3IONS-SCNC: 11 MMOL/L (ref 9–17)
AST SERPL-CCNC: 22 U/L
BACTERIA URNS QL MICRO: ABNORMAL
BASOPHILS # BLD: 0 K/UL (ref 0–0.2)
BASOPHILS # BLD: 1 % (ref 0–2)
BILIRUB SERPL-MCNC: <0.1 MG/DL (ref 0.3–1.2)
BILIRUB UR QL STRIP: NEGATIVE
BUN SERPL-MCNC: 9 MG/DL (ref 6–20)
CALCIUM SERPL-MCNC: 9.4 MG/DL (ref 8.6–10.4)
CHARACTER UR: ABNORMAL
CHLORIDE SERPL-SCNC: 110 MMOL/L (ref 98–107)
CLARITY UR: ABNORMAL
CO2 SERPL-SCNC: 25 MMOL/L (ref 20–31)
COLOR UR: YELLOW
CREAT SERPL-MCNC: 0.7 MG/DL (ref 0.5–0.9)
EOSINOPHIL # BLD: 0 K/UL (ref 0–0.4)
EOSINOPHILS RELATIVE PERCENT: 1 % (ref 1–4)
EPI CELLS #/AREA URNS HPF: ABNORMAL /HPF (ref 0–5)
ERYTHROCYTE [DISTWIDTH] IN BLOOD BY AUTOMATED COUNT: 14.3 % (ref 12.5–15.4)
GFR SERPL CREATININE-BSD FRML MDRD: >60 ML/MIN/1.73M2
GLUCOSE SERPL-MCNC: 94 MG/DL (ref 70–99)
GLUCOSE UR STRIP.AUTO-MCNC: NEGATIVE MG/DL
HCG(URINE) PREGNANCY TEST: NEGATIVE
HCT VFR BLD AUTO: 34.9 % (ref 36–46)
HGB BLD-MCNC: 11.7 G/DL (ref 12–16)
HGB UR QL STRIP.AUTO: NEGATIVE
KETONES UR STRIP.AUTO-MCNC: NEGATIVE MG/DL
LEUKOCYTE ESTERASE UR QL STRIP: NEGATIVE
LIPASE SERPL-CCNC: 25 U/L (ref 13–60)
LYMPHOCYTES # BLD: 44 % (ref 25–45)
LYMPHOCYTES NFR BLD: 2.5 K/UL (ref 1.2–5.2)
MCH RBC QN AUTO: 27.3 PG (ref 26–34)
MCHC RBC AUTO-ENTMCNC: 33.4 G/DL (ref 31–37)
MCV RBC AUTO: 81.8 FL (ref 80–100)
MONOCYTES NFR BLD: 0.5 K/UL (ref 0.1–1.4)
MONOCYTES NFR BLD: 8 % (ref 2–8)
NEUTROPHILS NFR BLD: 46 % (ref 34–64)
NEUTS SEG NFR BLD: 2.6 K/UL (ref 1.8–8)
NITRITE UR QL STRIP: NEGATIVE
PLATELET # BLD AUTO: 257 K/UL (ref 140–450)
PMV BLD AUTO: 7.5 FL (ref 6–12)
POTASSIUM SERPL-SCNC: 4.3 MMOL/L (ref 3.7–5.3)
PROT SERPL-MCNC: 6.6 G/DL (ref 6.4–8.3)
PROT UR STRIP-MCNC: NEGATIVE MG/DL
PROT UR STRIP.AUTO-MCNC: 7.5 MG/DL (ref 5–8)
RBC # BLD AUTO: 4.27 M/UL (ref 4–5.2)
RBC #/AREA URNS HPF: ABNORMAL /HPF (ref 0–2)
SODIUM SERPL-SCNC: 146 MMOL/L (ref 135–144)
SP GR UR STRIP.AUTO: 1.02 (ref 1–1.03)
UROBILINOGEN UR STRIP-ACNC: NORMAL
WBC #/AREA URNS HPF: ABNORMAL /HPF (ref 0–5)
WBC OTHER # BLD: 5.6 K/UL (ref 4.5–13.5)

## 2023-05-15 PROCEDURE — 36415 COLL VENOUS BLD VENIPUNCTURE: CPT

## 2023-05-15 PROCEDURE — 81025 URINE PREGNANCY TEST: CPT

## 2023-05-15 PROCEDURE — 99285 EMERGENCY DEPT VISIT HI MDM: CPT

## 2023-05-15 PROCEDURE — 2580000003 HC RX 258: Performed by: SPECIALIST

## 2023-05-15 PROCEDURE — 85025 COMPLETE CBC W/AUTO DIFF WBC: CPT

## 2023-05-15 PROCEDURE — 74177 CT ABD & PELVIS W/CONTRAST: CPT

## 2023-05-15 PROCEDURE — 80053 COMPREHEN METABOLIC PANEL: CPT

## 2023-05-15 PROCEDURE — 81001 URINALYSIS AUTO W/SCOPE: CPT

## 2023-05-15 PROCEDURE — 83690 ASSAY OF LIPASE: CPT

## 2023-05-15 PROCEDURE — 6360000004 HC RX CONTRAST MEDICATION: Performed by: SPECIALIST

## 2023-05-15 PROCEDURE — 6370000000 HC RX 637 (ALT 250 FOR IP): Performed by: SPECIALIST

## 2023-05-15 RX ORDER — SODIUM CHLORIDE 0.9 % (FLUSH) 0.9 %
5-40 SYRINGE (ML) INJECTION PRN
Status: DISCONTINUED | OUTPATIENT
Start: 2023-05-15 | End: 2023-05-16 | Stop reason: HOSPADM

## 2023-05-15 RX ORDER — DROSPIRENONE AND ETHINYL ESTRADIOL 0.02-3(28)
1 KIT ORAL DAILY
COMMUNITY
Start: 2023-05-10

## 2023-05-15 RX ORDER — 0.9 % SODIUM CHLORIDE 0.9 %
1000 INTRAVENOUS SOLUTION INTRAVENOUS ONCE
Status: COMPLETED | OUTPATIENT
Start: 2023-05-15 | End: 2023-05-15

## 2023-05-15 RX ORDER — DULOXETIN HYDROCHLORIDE 20 MG/1
20 CAPSULE, DELAYED RELEASE ORAL DAILY
COMMUNITY
Start: 2023-05-08

## 2023-05-15 RX ORDER — DICYCLOMINE HYDROCHLORIDE 10 MG/1
10 CAPSULE ORAL 3 TIMES DAILY PRN
COMMUNITY
Start: 2023-04-03

## 2023-05-15 RX ORDER — DICYCLOMINE HYDROCHLORIDE 10 MG/ML
20 INJECTION INTRAMUSCULAR ONCE
Status: DISCONTINUED | OUTPATIENT
Start: 2023-05-15 | End: 2023-05-15

## 2023-05-15 RX ORDER — DICYCLOMINE HYDROCHLORIDE 10 MG/1
20 CAPSULE ORAL ONCE
Status: COMPLETED | OUTPATIENT
Start: 2023-05-15 | End: 2023-05-15

## 2023-05-15 RX ORDER — 0.9 % SODIUM CHLORIDE 0.9 %
70 INTRAVENOUS SOLUTION INTRAVENOUS ONCE
Status: COMPLETED | OUTPATIENT
Start: 2023-05-15 | End: 2023-05-15

## 2023-05-15 RX ORDER — CYCLOBENZAPRINE HCL 5 MG
5 TABLET ORAL NIGHTLY PRN
COMMUNITY
Start: 2023-05-08

## 2023-05-15 RX ORDER — DICYCLOMINE HYDROCHLORIDE 10 MG/1
10 CAPSULE ORAL 4 TIMES DAILY
Qty: 15 CAPSULE | Refills: 0 | Status: SHIPPED | OUTPATIENT
Start: 2023-05-15

## 2023-05-15 RX ADMIN — SODIUM CHLORIDE, PRESERVATIVE FREE 10 ML: 5 INJECTION INTRAVENOUS at 22:25

## 2023-05-15 RX ADMIN — SODIUM CHLORIDE 1000 ML: 9 INJECTION, SOLUTION INTRAVENOUS at 21:35

## 2023-05-15 RX ADMIN — IOPAMIDOL 75 ML: 755 INJECTION, SOLUTION INTRAVENOUS at 22:24

## 2023-05-15 RX ADMIN — DICYCLOMINE HYDROCHLORIDE 20 MG: 10 CAPSULE ORAL at 21:50

## 2023-05-15 RX ADMIN — SODIUM CHLORIDE 70 ML: 9 INJECTION, SOLUTION INTRAVENOUS at 22:25

## 2023-05-15 ASSESSMENT — PAIN DESCRIPTION - ORIENTATION: ORIENTATION: LEFT;RIGHT;LOWER

## 2023-05-15 ASSESSMENT — PAIN DESCRIPTION - PAIN TYPE: TYPE: ACUTE PAIN

## 2023-05-15 ASSESSMENT — PAIN DESCRIPTION - FREQUENCY: FREQUENCY: CONTINUOUS

## 2023-05-15 ASSESSMENT — PAIN DESCRIPTION - LOCATION: LOCATION: ABDOMEN

## 2023-05-15 ASSESSMENT — PAIN - FUNCTIONAL ASSESSMENT: PAIN_FUNCTIONAL_ASSESSMENT: 0-10

## 2023-05-16 ASSESSMENT — ENCOUNTER SYMPTOMS
SHORTNESS OF BREATH: 0
CONSTIPATION: 1
COUGH: 0
SORE THROAT: 0
VOMITING: 0
NAUSEA: 1
ABDOMINAL PAIN: 1
BLOOD IN STOOL: 0
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
## 2023-05-16 NOTE — DISCHARGE INSTRUCTIONS
PLEASE RETURN TO THE EMERGENCY DEPARTMENT IMMEDIATELY if your symptoms worsen in anyway or in 8-12 hours if not improved for re-evaluation. You should immediately return to the ER for symptoms such as increasing pain, bloody stool, fever, a feeling of passing out, light headed, dizziness, chest pain, shortness of breath, persistent nausea and/or vomiting, numbness or weakness to the arms or legs, coolness or color change of the arms or legs. Take your medication as indicated and prescribed. If you are given an antibiotic then, make sure you get the prescription filled and take the antibiotics until finished. Please understand that at this time there is no evidence for a more serious underlying process, but that early in the process of an illness or injury, an emergency department workup can be falsely reassuring. You should contact your family doctor within the next 24 hours for a follow up appointment    Abelino Davenport!!!    From [facility] and [facility] Emergency Services    On behalf of the Emergency Department staff at [facility]), I would like to thank you for giving us the opportunity to address your health care needs and concerns. We hope that during your visit, our service was delivered in a professional and caring manner. Please keep [facility] in mind as we walk with you down the path to your own personal wellness. Please expect an automated text message or email from us so we can ask a few questions about your health and progress. Based on your answers, a clinician may call you back to offer help and instructions. Please understand that early in the process of an illness or injury, an emergency department workup can be falsely reassuring. If you notice any worsening, changing or persistent symptoms please call your family doctor or return to the ER immediately. Tell us how we did during your visit at [email or web address]   and let us know about your experience

## 2023-06-07 ENCOUNTER — HOSPITAL ENCOUNTER (EMERGENCY)
Facility: CLINIC | Age: 18
Discharge: HOME | End: 2023-06-07
Attending: EMERGENCY MEDICINE
Payer: MEDICAID

## 2023-06-07 VITALS
HEART RATE: 78 BPM | TEMPERATURE: 98.2 F | SYSTOLIC BLOOD PRESSURE: 135 MMHG | RESPIRATION RATE: 17 BRPM | OXYGEN SATURATION: 98 % | BODY MASS INDEX: 20.8 KG/M2 | DIASTOLIC BLOOD PRESSURE: 84 MMHG | WEIGHT: 125 LBS

## 2023-06-07 DIAGNOSIS — J02.9 ACUTE PHARYNGITIS, UNSPECIFIED ETIOLOGY: Primary | ICD-10-CM

## 2023-06-07 LAB
HETEROPH AB BLD QL IA: NEGATIVE
S PYO AG THROAT QL: NEGATIVE
SPECIMEN SOURCE: NORMAL

## 2023-06-07 PROCEDURE — 87880 STREP A ASSAY W/OPTIC: CPT

## 2023-06-07 PROCEDURE — 86308 HETEROPHILE ANTIBODY SCREEN: CPT

## 2023-06-07 PROCEDURE — 36415 COLL VENOUS BLD VENIPUNCTURE: CPT

## 2023-06-07 PROCEDURE — 6360000002 HC RX W HCPCS: Performed by: EMERGENCY MEDICINE

## 2023-06-07 RX ORDER — AMOXICILLIN 875 MG/1
875 TABLET, COATED ORAL 2 TIMES DAILY
Qty: 20 TABLET | Refills: 0 | Status: SHIPPED | OUTPATIENT
Start: 2023-06-07 | End: 2023-06-17

## 2023-06-07 RX ORDER — KETOROLAC TROMETHAMINE 30 MG/ML
30 INJECTION, SOLUTION INTRAMUSCULAR; INTRAVENOUS ONCE
Status: COMPLETED | OUTPATIENT
Start: 2023-06-07 | End: 2023-06-07

## 2023-06-07 RX ORDER — DEXAMETHASONE SODIUM PHOSPHATE 10 MG/ML
10 INJECTION, SOLUTION INTRAMUSCULAR; INTRAVENOUS ONCE
Status: COMPLETED | OUTPATIENT
Start: 2023-06-07 | End: 2023-06-07

## 2023-06-07 RX ADMIN — KETOROLAC TROMETHAMINE 30 MG: 30 INJECTION, SOLUTION INTRAMUSCULAR; INTRAVENOUS at 18:52

## 2023-06-07 RX ADMIN — DEXAMETHASONE SODIUM PHOSPHATE 10 MG: 10 INJECTION INTRAMUSCULAR; INTRAVENOUS at 18:52

## 2023-06-07 ASSESSMENT — PAIN SCALES - GENERAL
PAINLEVEL_OUTOF10: 2
PAINLEVEL_OUTOF10: 8

## 2023-06-07 NOTE — ED PROVIDER NOTES
EKG's are interpreted by the Emergency Department Physician who either signs or Co-signs this chart in the absence of a cardiologist.        Not indicated unless otherwise documented above    LABS:  Results for orders placed or performed during the hospital encounter of 06/07/23   Strep Screen Group A Throat    Specimen: Throat   Result Value Ref Range    Source . THROAT SWAB     Strep A Ag NEGATIVE NEGATIVE   Mononucleosis Screen   Result Value Ref Range    Mononucleosis Screen NEGATIVE NEGATIVE       Not indicated unless otherwise documented above    RADIOLOGY:   I reviewed the radiologist interpretations:    No orders to display       Not indicated unless otherwise documented above    EMERGENCY DEPARTMENT COURSE:     The patient was given the following medications:  Orders Placed This Encounter   Medications    dexamethasone (DECADRON) Oral 10 mg    ketorolac (TORADOL) injection 30 mg    amoxicillin (AMOXIL) 875 MG tablet     Sig: Take 1 tablet by mouth 2 times daily for 10 days     Dispense:  20 tablet     Refill:  0        Vitals:   -------------------------  /84   Pulse 78   Temp 98.2 °F (36.8 °C)   Resp 17   Wt 56.7 kg (125 lb)   LMP 05/01/2023   SpO2 98%   BMI 20.80 kg/m²     7 PM strep and mono negative. With unilateral redness and persistent symptoms for the past week and a half we will cover for bacterial possibility. Prescription for amoxicillin. Follow-up with family physician for reevaluation and return if worsening symptoms or any other concerns. The patient understands that at this time there is no evidence for a more malignant underlying process, but also understands that early in the process of an illness or injury, an emergency department workup can be falsely reassuring.   Routine discharge counseling was given, and it is understood that worsening, changing or persistent symptoms should prompt an immediate call or follow up with their primary physician or return to the emergency

## 2023-06-07 NOTE — DISCHARGE INSTRUCTIONS
Take medications as prescribed    Return immediately if any worsening symptoms or any other concerns    Tell us how we did visit: [email or web address]   and let us know about your experience

## 2023-09-19 ENCOUNTER — HOSPITAL ENCOUNTER (EMERGENCY)
Facility: CLINIC | Age: 18
Discharge: HOME | End: 2023-09-19
Attending: EMERGENCY MEDICINE
Payer: MEDICAID

## 2023-09-19 VITALS
TEMPERATURE: 98.9 F | OXYGEN SATURATION: 99 % | HEART RATE: 80 BPM | WEIGHT: 110 LBS | DIASTOLIC BLOOD PRESSURE: 82 MMHG | RESPIRATION RATE: 18 BRPM | HEIGHT: 65 IN | SYSTOLIC BLOOD PRESSURE: 143 MMHG | BODY MASS INDEX: 18.33 KG/M2

## 2023-09-19 DIAGNOSIS — J01.00 ACUTE NON-RECURRENT MAXILLARY SINUSITIS: Primary | ICD-10-CM

## 2023-09-19 PROCEDURE — 99283 EMERGENCY DEPT VISIT LOW MDM: CPT

## 2023-09-19 RX ORDER — AMOXICILLIN AND CLAVULANATE POTASSIUM 875; 125 MG/1; MG/1
1 TABLET, FILM COATED ORAL ONCE
Status: DISCONTINUED | OUTPATIENT
Start: 2023-09-19 | End: 2023-09-19

## 2023-09-19 RX ORDER — FLUTICASONE PROPIONATE 50 MCG
2 SPRAY, SUSPENSION (ML) NASAL DAILY
Qty: 16 G | Refills: 0 | Status: SHIPPED | OUTPATIENT
Start: 2023-09-19

## 2023-09-19 RX ORDER — AMOXICILLIN AND CLAVULANATE POTASSIUM 875; 125 MG/1; MG/1
1 TABLET, FILM COATED ORAL 2 TIMES DAILY
Qty: 10 TABLET | Refills: 0 | Status: SHIPPED | OUTPATIENT
Start: 2023-09-19 | End: 2023-09-24

## 2023-09-19 RX ORDER — FLUTICASONE PROPIONATE 50 MCG
1 SPRAY, SUSPENSION (ML) NASAL DAILY
Status: DISCONTINUED | OUTPATIENT
Start: 2023-09-20 | End: 2023-09-19

## 2023-09-19 ASSESSMENT — PAIN DESCRIPTION - FREQUENCY: FREQUENCY: CONTINUOUS

## 2023-09-19 ASSESSMENT — PAIN - FUNCTIONAL ASSESSMENT: PAIN_FUNCTIONAL_ASSESSMENT: 0-10

## 2023-09-19 ASSESSMENT — ENCOUNTER SYMPTOMS
SORE THROAT: 1
SINUS PAIN: 1
COUGH: 1
RHINORRHEA: 1
SINUS PRESSURE: 1

## 2023-09-19 ASSESSMENT — PAIN DESCRIPTION - PAIN TYPE: TYPE: ACUTE PAIN

## 2023-09-19 ASSESSMENT — PAIN DESCRIPTION - ONSET: ONSET: ON-GOING

## 2023-09-19 ASSESSMENT — PAIN SCALES - GENERAL: PAINLEVEL_OUTOF10: 7

## 2023-09-19 ASSESSMENT — PAIN DESCRIPTION - DESCRIPTORS: DESCRIPTORS: THROBBING;BURNING

## 2023-09-19 ASSESSMENT — PAIN DESCRIPTION - LOCATION: LOCATION: THROAT;HEAD

## 2023-09-20 NOTE — ED NOTES
Pt. To room # 10 from waiting area, gait steady. Pt. C/o possible sinus infection. Pt. C/o chills, nasal drip, and headache. Pt. Did take mucinex x2 today without relief. Pt. Alert and oriented x4. RR equal and non labored. NAD noted. Call light within reach.      Juan Carlos Elmore RN  09/19/23 2025

## 2023-09-23 ENCOUNTER — APPOINTMENT (OUTPATIENT)
Dept: GENERAL RADIOLOGY | Facility: CLINIC | Age: 18
End: 2023-09-23
Payer: MEDICAID

## 2023-09-23 ENCOUNTER — HOSPITAL ENCOUNTER (EMERGENCY)
Facility: CLINIC | Age: 18
Discharge: HOME | End: 2023-09-23
Attending: STUDENT IN AN ORGANIZED HEALTH CARE EDUCATION/TRAINING PROGRAM
Payer: MEDICAID

## 2023-09-23 VITALS
RESPIRATION RATE: 16 BRPM | WEIGHT: 110 LBS | TEMPERATURE: 99.1 F | HEIGHT: 65 IN | BODY MASS INDEX: 18.33 KG/M2 | OXYGEN SATURATION: 100 % | HEART RATE: 74 BPM | SYSTOLIC BLOOD PRESSURE: 139 MMHG | DIASTOLIC BLOOD PRESSURE: 85 MMHG

## 2023-09-23 DIAGNOSIS — R11.0 NAUSEA: ICD-10-CM

## 2023-09-23 DIAGNOSIS — K59.00 CONSTIPATION, UNSPECIFIED CONSTIPATION TYPE: Primary | ICD-10-CM

## 2023-09-23 DIAGNOSIS — R10.9 ABDOMINAL PAIN, UNSPECIFIED ABDOMINAL LOCATION: ICD-10-CM

## 2023-09-23 LAB
ALBUMIN SERPL-MCNC: 4.2 G/DL (ref 3.5–5.2)
ALBUMIN/GLOB SERPL: 1.6 {RATIO} (ref 1–2.5)
ALP SERPL-CCNC: 73 U/L (ref 35–104)
ALT SERPL-CCNC: 18 U/L (ref 5–33)
ANION GAP SERPL CALCULATED.3IONS-SCNC: 12 MMOL/L (ref 9–17)
AST SERPL-CCNC: 21 U/L
BACTERIA URNS QL MICRO: ABNORMAL
BASOPHILS # BLD: 0.1 K/UL (ref 0–0.2)
BASOPHILS NFR BLD: 1 % (ref 0–2)
BILIRUB DIRECT SERPL-MCNC: <0.1 MG/DL
BILIRUB INDIRECT SERPL-MCNC: ABNORMAL MG/DL (ref 0–1)
BILIRUB SERPL-MCNC: 0.2 MG/DL (ref 0.3–1.2)
BILIRUB UR QL STRIP: NEGATIVE
BUN SERPL-MCNC: 8 MG/DL (ref 6–20)
CALCIUM SERPL-MCNC: 9.5 MG/DL (ref 8.6–10.4)
CHLORIDE SERPL-SCNC: 102 MMOL/L (ref 98–107)
CLARITY UR: CLEAR
CO2 SERPL-SCNC: 24 MMOL/L (ref 20–31)
COLOR UR: YELLOW
CREAT SERPL-MCNC: 0.6 MG/DL (ref 0.5–0.9)
EOSINOPHIL # BLD: 0.1 K/UL (ref 0–0.4)
EOSINOPHILS RELATIVE PERCENT: 1 % (ref 1–4)
EPI CELLS #/AREA URNS HPF: ABNORMAL /HPF (ref 0–5)
ERYTHROCYTE [DISTWIDTH] IN BLOOD BY AUTOMATED COUNT: 14 % (ref 12.5–15.4)
GFR SERPL CREATININE-BSD FRML MDRD: >60 ML/MIN/1.73M2
GLUCOSE SERPL-MCNC: 98 MG/DL (ref 70–99)
GLUCOSE UR STRIP-MCNC: NEGATIVE MG/DL
HCG UR QL: NEGATIVE
HCT VFR BLD AUTO: 36.4 % (ref 36–46)
HGB BLD-MCNC: 12.1 G/DL (ref 12–16)
HGB UR QL STRIP.AUTO: NEGATIVE
KETONES UR STRIP-MCNC: ABNORMAL MG/DL
LEUKOCYTE ESTERASE UR QL STRIP: NEGATIVE
LYMPHOCYTES NFR BLD: 2.8 K/UL (ref 1.2–5.2)
LYMPHOCYTES RELATIVE PERCENT: 43 % (ref 25–45)
MCH RBC QN AUTO: 27 PG (ref 26–34)
MCHC RBC AUTO-ENTMCNC: 33.3 G/DL (ref 31–37)
MCV RBC AUTO: 81.2 FL (ref 80–100)
MONOCYTES NFR BLD: 0.5 K/UL (ref 0.1–1.4)
MONOCYTES NFR BLD: 7 % (ref 2–8)
MUCOUS THREADS URNS QL MICRO: ABNORMAL
NEUTROPHILS NFR BLD: 48 % (ref 34–64)
NEUTS SEG NFR BLD: 3.1 K/UL (ref 1.8–8)
NITRITE UR QL STRIP: NEGATIVE
PH UR STRIP: 6 [PH] (ref 5–8)
PLATELET # BLD AUTO: 397 K/UL (ref 140–450)
PMV BLD AUTO: 7.4 FL (ref 6–12)
POTASSIUM SERPL-SCNC: 3.7 MMOL/L (ref 3.7–5.3)
PROT SERPL-MCNC: 6.9 G/DL (ref 6.4–8.3)
PROT UR STRIP-MCNC: ABNORMAL MG/DL
RBC # BLD AUTO: 4.49 M/UL (ref 4–5.2)
RBC #/AREA URNS HPF: ABNORMAL /HPF (ref 0–2)
SODIUM SERPL-SCNC: 138 MMOL/L (ref 135–144)
SP GR UR STRIP: 1.03 (ref 1–1.03)
UROBILINOGEN UR STRIP-ACNC: NORMAL EU/DL (ref 0–1)
WBC #/AREA URNS HPF: ABNORMAL /HPF (ref 0–5)
WBC OTHER # BLD: 6.5 K/UL (ref 4.5–13.5)

## 2023-09-23 PROCEDURE — 81001 URINALYSIS AUTO W/SCOPE: CPT

## 2023-09-23 PROCEDURE — 99284 EMERGENCY DEPT VISIT MOD MDM: CPT

## 2023-09-23 PROCEDURE — 2580000003 HC RX 258: Performed by: STUDENT IN AN ORGANIZED HEALTH CARE EDUCATION/TRAINING PROGRAM

## 2023-09-23 PROCEDURE — 80048 BASIC METABOLIC PNL TOTAL CA: CPT

## 2023-09-23 PROCEDURE — 80076 HEPATIC FUNCTION PANEL: CPT

## 2023-09-23 PROCEDURE — 36415 COLL VENOUS BLD VENIPUNCTURE: CPT

## 2023-09-23 PROCEDURE — 87086 URINE CULTURE/COLONY COUNT: CPT

## 2023-09-23 PROCEDURE — 85025 COMPLETE CBC W/AUTO DIFF WBC: CPT

## 2023-09-23 PROCEDURE — 6370000000 HC RX 637 (ALT 250 FOR IP): Performed by: STUDENT IN AN ORGANIZED HEALTH CARE EDUCATION/TRAINING PROGRAM

## 2023-09-23 PROCEDURE — 74018 RADEX ABDOMEN 1 VIEW: CPT

## 2023-09-23 PROCEDURE — 81025 URINE PREGNANCY TEST: CPT

## 2023-09-23 RX ORDER — ACETAMINOPHEN 500 MG
1000 TABLET ORAL ONCE
Status: COMPLETED | OUTPATIENT
Start: 2023-09-23 | End: 2023-09-23

## 2023-09-23 RX ORDER — SENNOSIDES A AND B 8.6 MG/1
1 TABLET, FILM COATED ORAL DAILY
COMMUNITY

## 2023-09-23 RX ORDER — DOCUSATE SODIUM 100 MG/1
100 CAPSULE, LIQUID FILLED ORAL 2 TIMES DAILY
Qty: 28 CAPSULE | Refills: 0 | Status: SHIPPED | OUTPATIENT
Start: 2023-09-23 | End: 2023-10-07

## 2023-09-23 RX ORDER — 0.9 % SODIUM CHLORIDE 0.9 %
1000 INTRAVENOUS SOLUTION INTRAVENOUS ONCE
Status: COMPLETED | OUTPATIENT
Start: 2023-09-23 | End: 2023-09-23

## 2023-09-23 RX ADMIN — ACETAMINOPHEN 1000 MG: 500 TABLET ORAL at 21:35

## 2023-09-23 RX ADMIN — SODIUM CHLORIDE 1000 ML: 9 INJECTION, SOLUTION INTRAVENOUS at 21:36

## 2023-09-23 RX ADMIN — MAGNESIUM HYDROXIDE 30 ML: 400 SUSPENSION ORAL at 23:17

## 2023-09-23 ASSESSMENT — PAIN DESCRIPTION - ORIENTATION: ORIENTATION: LEFT;RIGHT;LOWER

## 2023-09-23 ASSESSMENT — PAIN DESCRIPTION - LOCATION: LOCATION: ABDOMEN

## 2023-09-23 ASSESSMENT — PAIN DESCRIPTION - ONSET: ONSET: GRADUAL

## 2023-09-23 ASSESSMENT — PAIN DESCRIPTION - PAIN TYPE: TYPE: ACUTE PAIN

## 2023-09-23 ASSESSMENT — PAIN DESCRIPTION - DESCRIPTORS: DESCRIPTORS: ACHING;THROBBING

## 2023-09-23 ASSESSMENT — PAIN SCALES - GENERAL: PAINLEVEL_OUTOF10: 7

## 2023-09-23 ASSESSMENT — PAIN DESCRIPTION - FREQUENCY: FREQUENCY: CONTINUOUS

## 2023-09-24 NOTE — ED NOTES
Pt presents to ED ambulatory a&o x4. Pt comes with complaints of bilateral lower abd pain for the past few days. Pt states she has been constipated. Last normal BM was 5 days ago. Pt states she had small amount of hard stool today. Pt takes senakot stool softener but has not had relief. Nausea without emesis.       Robin Haynes RN  09/23/23 [address] presents today for follow up of GERD, Depression, Cerebral Palsy, Hearing Impairment    Current Outpatient Medications   Medication Sig Dispense Refill    sucralfate (CARAFATE) 1 GM/10ML suspension take 10 milliliters ( 2 TEASPOONFULS ) by mouth four times a day . ..  (REFER TO PRESCRIPTION NOTES). 1200 mL 2    citalopram (CELEXA) 40 MG tablet take 1 tablet by mouth once daily 90 tablet 0    fluticasone (FLONASE) 50 MCG/ACT nasal spray 2 sprays by Each Nostril route at bedtime 16 g 1    pseudoephedrine (DECONGESTANT) 30 MG tablet Take 1 tablet by mouth every 6 hours as needed for Congestion 15 tablet 0    lansoprazole (PREVACID SOLUTAB) 30 MG disintegrating tablet take 1 tablet by mouth once daily 90 tablet 0    meclizine (ANTIVERT) 12.5 MG tablet take 1 tablet by mouth three times a day       No current facility-administered medications for this visit. Past Medical History:   Diagnosis Date    Anemia     Cerebral palsy (HCC)     Decreased hearing     Bilateral hearing aids    Depression     GERD (gastroesophageal reflux disease)     Impaired ambulation     Labyrinthitis           Subjective:  Cold sxs since yesterday. Head congestion, scratchy throat, no fever, slight cough. Stomach ok as long as she takes Prevacid and Carafate every day. Watches diet. At home mostly crawls on knees for ambulation. Usually in a good mood, Depression sxs controlled on meds. Cough  Pertinent negatives include no chest pain, chills, ear pain, headaches, postnasal drip, rhinorrhea or sore throat. Review of Systems   Constitutional:  Negative for activity change, appetite change and chills. HENT:  Positive for congestion. Negative for ear pain, mouth sores, postnasal drip, rhinorrhea, sinus pressure, sneezing, sore throat and trouble swallowing. Eyes:  Negative for visual disturbance. Respiratory:  Positive for cough. Cardiovascular:  Negative for chest pain, palpitations and leg swelling.

## 2023-09-24 NOTE — ED PROVIDER NOTES
[address] [facility] ED  Emergency Department Encounter  [facility]       Pt Name:Yarelis Toth  MRN: [id number]  [address] [facility] [place] 2005  Date of evaluation: 9/24/23  PCP:  Maximus Cain      CHIEF COMPLAINT       Chief Complaint   Patient presents with    Abdominal Pain    Nausea    Constipation       HISTORY OF PRESENT ILLNESS     Bibi Henning is a 19 y.o. female who presents via personal vehicle with boyfriend at bedside helping friend history. Patient reporting a recent work-up here in the emergency department and diagnosis of sinusitis. Patient reporting multiple days of oral antibiotic and was reportedly told that if she had acute worsening of abdominal pain to return to the emergency department for reevaluation. Patient had no abdominal pain at the time of work-up for her sinusitis. Patient is reporting abdominal discomfort and concern for constipation. Patient reports that she has been constipated in the past.  Patient did have a bowel movement today although she does report her bowel movements have been small fecal matter and hard in consistency. No blood in them. Has had some associated associated nausea. No vomiting. Is unsure of her pregnancy status does report that there is a chance she could be pregnant. Denies any vaginal bleeding, vaginal discharge or concerns for STDs. Patient reports a normal previous last menstrual cycle. Patient denies any chest pain or shortness of breath. Denies any fevers or chills. Denies any headache or blurred vision, cough or congestion. Patient presenting to the emergency department this evening due to increased abdominal pain with strict return precautions provided via previous emergency department work-up for sinusitis with initiation of antibiotics and strict return precautions reporting if she had increased abdominal pain to report for reevaluation.     PAST MEDICAL / SURGICAL / SOCIAL / FAMILY HISTORY      has a

## 2023-09-24 NOTE — DISCHARGE INSTRUCTIONS
SUMMARY OF YOUR VISIT    Today you were seen for abdominal pain and constipation. We discussed your labs and imaging. Your imaging was consistent with constipation. We provided you a one-time dose of a laxative here in the emergency department. As we discussed this will likely cause your bowels to move in the next hour or so. I have also prescribed you written prescriptions for Metamucil which is a fiber supplement, and Colace which is a stool softener. We discussed the importance of increasing your water intake to at minimum 2.5 L a day. We discussed that I would like you to take the fiber supplement as well as the Colace as prescribed daily for the next 7 days or until your bowels are moving regularly, if you experience worsening abdominal pain, bloody stools, bloody diarrhea, inability to tolerate oral intake meaning that you are unable to keep food or liquids or medications down I do recommend that you be immediately reevaluated in the emergency department. Please continue to take your home medication as previously prescribed, I have made no changes to your home medications. You can return to our or another Emergency Department as needed or for worsening symptoms of chest pain, shortness of breath, high fevers not relieved by acetaminophen (Tylenol) and/or ibuprofen (Motrin / Advil), chills, feeling of your heart fluttering or racing, persistent nausea and/or vomiting, vomiting up blood, blood in your stool, loss of consciousness, numbness, weakness or tingling in the arms or legs or change in color of the extremities, changes in mental status, persistent headache, blurry vision, loss of bladder / bowel control, if you are unable to follow up with your physician, or other any other care or concern. Thank You! On behalf of the Emergency Department staff and team, I would like to thank you for allowing us the opportunity to participate in your health care and evaluation today.

## 2023-09-25 LAB
MICROORGANISM SPEC CULT: NO GROWTH
SPECIMEN DESCRIPTION: NORMAL

## 2023-10-03 ENCOUNTER — HOSPITAL ENCOUNTER (EMERGENCY)
Facility: CLINIC | Age: 18
Discharge: HOME | End: 2023-10-03
Attending: EMERGENCY MEDICINE
Payer: MEDICAID

## 2023-10-03 VITALS
DIASTOLIC BLOOD PRESSURE: 81 MMHG | HEART RATE: 96 BPM | RESPIRATION RATE: 16 BRPM | OXYGEN SATURATION: 100 % | HEIGHT: 65 IN | SYSTOLIC BLOOD PRESSURE: 120 MMHG | BODY MASS INDEX: 18.33 KG/M2 | TEMPERATURE: 98.1 F | WEIGHT: 110 LBS

## 2023-10-03 DIAGNOSIS — H60.503 ACUTE OTITIS EXTERNA OF BOTH EARS, UNSPECIFIED TYPE: Primary | ICD-10-CM

## 2023-10-03 PROCEDURE — 99284 EMERGENCY DEPT VISIT MOD MDM: CPT

## 2023-10-03 PROCEDURE — 96372 THER/PROPH/DIAG INJ SC/IM: CPT

## 2023-10-03 PROCEDURE — 6360000002 HC RX W HCPCS

## 2023-10-03 RX ORDER — IBUPROFEN 800 MG/1
800 TABLET ORAL EVERY 8 HOURS PRN
Qty: 30 TABLET | Refills: 0 | Status: SHIPPED | OUTPATIENT
Start: 2023-10-03

## 2023-10-03 RX ORDER — OFLOXACIN 3 MG/ML
5 SOLUTION AURICULAR (OTIC) DAILY
Qty: 5 ML | Refills: 0 | Status: SHIPPED | OUTPATIENT
Start: 2023-10-03 | End: 2023-10-13

## 2023-10-03 RX ORDER — KETOROLAC TROMETHAMINE 30 MG/ML
30 INJECTION, SOLUTION INTRAMUSCULAR; INTRAVENOUS ONCE
Status: COMPLETED | OUTPATIENT
Start: 2023-10-03 | End: 2023-10-03

## 2023-10-03 RX ADMIN — KETOROLAC TROMETHAMINE 30 MG: 30 INJECTION, SOLUTION INTRAMUSCULAR; INTRAVENOUS at 10:17

## 2023-10-03 ASSESSMENT — PAIN - FUNCTIONAL ASSESSMENT: PAIN_FUNCTIONAL_ASSESSMENT: 0-10

## 2023-10-03 ASSESSMENT — PAIN SCALES - GENERAL: PAINLEVEL_OUTOF10: 8

## 2023-10-03 ASSESSMENT — LIFESTYLE VARIABLES
HOW OFTEN DO YOU HAVE A DRINK CONTAINING ALCOHOL: NEVER
HOW MANY STANDARD DRINKS CONTAINING ALCOHOL DO YOU HAVE ON A TYPICAL DAY: PATIENT DOES NOT DRINK

## 2023-10-03 NOTE — DISCHARGE INSTRUCTIONS
Take medication as prescribed, it may take 48 to 72 hours for the ear pain to start improving with the antibiotic drops use the Motrin every 8 hours as needed you may also add Tylenol. Follow-up next week with your family doctor if you are not having improvement.

## 2023-10-03 NOTE — ED TRIAGE NOTES
Patient presents with c/o bilateral ear pain, increasing in left ear for the past 2 days, denies injury

## 2023-10-05 ENCOUNTER — HOSPITAL ENCOUNTER (EMERGENCY)
Facility: CLINIC | Age: 18
Discharge: HOME | End: 2023-10-05
Attending: STUDENT IN AN ORGANIZED HEALTH CARE EDUCATION/TRAINING PROGRAM
Payer: MEDICAID

## 2023-10-05 VITALS
RESPIRATION RATE: 20 BRPM | BODY MASS INDEX: 17.68 KG/M2 | HEIGHT: 66 IN | SYSTOLIC BLOOD PRESSURE: 132 MMHG | DIASTOLIC BLOOD PRESSURE: 82 MMHG | TEMPERATURE: 99.8 F | OXYGEN SATURATION: 100 % | HEART RATE: 93 BPM | WEIGHT: 110 LBS

## 2023-10-05 DIAGNOSIS — H60.393 INFECTIVE OTITIS EXTERNA OF BOTH EARS: Primary | ICD-10-CM

## 2023-10-05 DIAGNOSIS — H65.93 BILATERAL NON-SUPPURATIVE OTITIS MEDIA: ICD-10-CM

## 2023-10-05 PROCEDURE — 6370000000 HC RX 637 (ALT 250 FOR IP): Performed by: STUDENT IN AN ORGANIZED HEALTH CARE EDUCATION/TRAINING PROGRAM

## 2023-10-05 PROCEDURE — 99283 EMERGENCY DEPT VISIT LOW MDM: CPT

## 2023-10-05 RX ORDER — CEFDINIR 300 MG/1
300 CAPSULE ORAL 2 TIMES DAILY
Qty: 20 CAPSULE | Refills: 0 | Status: SHIPPED | OUTPATIENT
Start: 2023-10-05 | End: 2023-10-15

## 2023-10-05 RX ORDER — CEFDINIR 300 MG/1
300 CAPSULE ORAL ONCE
Status: COMPLETED | OUTPATIENT
Start: 2023-10-05 | End: 2023-10-05

## 2023-10-05 RX ORDER — OFLOXACIN 3 MG/ML
5 SOLUTION AURICULAR (OTIC) 2 TIMES DAILY
Qty: 5 ML | Refills: 0 | Status: SHIPPED | OUTPATIENT
Start: 2023-10-05 | End: 2023-10-15

## 2023-10-05 RX ORDER — ACETAMINOPHEN 500 MG
1000 TABLET ORAL ONCE
Status: COMPLETED | OUTPATIENT
Start: 2023-10-05 | End: 2023-10-05

## 2023-10-05 RX ORDER — IBUPROFEN 600 MG/1
600 TABLET ORAL EVERY 6 HOURS PRN
Qty: 21 TABLET | Refills: 0 | Status: SHIPPED | OUTPATIENT
Start: 2023-10-05 | End: 2023-10-12

## 2023-10-05 RX ORDER — IBUPROFEN 600 MG/1
600 TABLET ORAL ONCE
Status: COMPLETED | OUTPATIENT
Start: 2023-10-05 | End: 2023-10-05

## 2023-10-05 RX ORDER — ACETAMINOPHEN 500 MG
1000 TABLET ORAL EVERY 6 HOURS PRN
Qty: 56 TABLET | Refills: 0 | Status: SHIPPED | OUTPATIENT
Start: 2023-10-05 | End: 2023-10-12

## 2023-10-05 RX ADMIN — IBUPROFEN 600 MG: 600 TABLET ORAL at 02:21

## 2023-10-05 RX ADMIN — CEFDINIR 300 MG: 300 CAPSULE ORAL at 02:25

## 2023-10-05 RX ADMIN — ACETAMINOPHEN 1000 MG: 500 TABLET ORAL at 02:21

## 2023-10-05 ASSESSMENT — PAIN DESCRIPTION - FREQUENCY: FREQUENCY: CONTINUOUS

## 2023-10-05 ASSESSMENT — PAIN DESCRIPTION - PAIN TYPE: TYPE: ACUTE PAIN

## 2023-10-05 ASSESSMENT — PAIN DESCRIPTION - LOCATION: LOCATION: EAR

## 2023-10-05 ASSESSMENT — PAIN DESCRIPTION - ORIENTATION: ORIENTATION: LEFT

## 2023-10-05 ASSESSMENT — PAIN - FUNCTIONAL ASSESSMENT: PAIN_FUNCTIONAL_ASSESSMENT: 0-10

## 2023-10-05 ASSESSMENT — PAIN SCALES - GENERAL: PAINLEVEL_OUTOF10: 8

## 2023-10-05 NOTE — ED PROVIDER NOTES
[address] [facility] ED  Emergency Department Encounter  [address] [facility]       Pt Name:Yarelis Hu  MRN: [id number]  [address] [facility] 2005  Date of evaluation: 10/5/23  PCP:  Hal Cespedes      CHIEF COMPLAINT       Chief Complaint   Patient presents with    Otitis Media       HISTORY OF PRESENT ILLNESS     Maikel Vallejo is a 19 y.o. female who presents via personal vehicle with mother at bedside to drive patient here due to worsening symptomatology. Patient was seen on 10/3/2023 and diagnosed with an otitis externa. Patient reports that she has been taking her eardrops as prescribed plus she has had worsening pain. This evening mother was attempting to provide her her eardrops although reportedly was unable to. Patient denies any blurred vision, eye pain, difficulty swallowing, difficulty moving the neck, chest pain, shortness of breath, fevers, chills. Denies any systemic symptoms. Pain is localized to the ears. Has not improved. Has had difficulty hearing due to feeling as if they are swallowing more. Difficulty providing antibiotic to the ears. PAST MEDICAL / SURGICAL / SOCIAL / FAMILY HISTORY      has a past medical history of Constipation and Depression. has a past surgical history that includes Hymenectomy and Tonsillectomy.       Social History     Socioeconomic History    Marital status: Single     Spouse name: Not on file    Number of children: Not on file    Years of education: Not on file    Highest education level: Not on file   Occupational History    Not on file   Tobacco Use    Smoking status: Never     Passive exposure: Never    Smokeless tobacco: Never   Substance and Sexual Activity    Alcohol use: No    Drug use: No    Sexual activity: Never   Other Topics Concern    Not on file   Social History Narrative    Not on file     Social Determinants of Health     Financial Resource Strain: Not on file   Food Insecurity: Not on file   Transportation

## 2023-10-05 NOTE — DISCHARGE INSTRUCTIONS
SUMMARY OF YOUR VISIT    Today you were seen for worsening ear pain. We discussed the importance of making sure that the medication, the eardrops are able to reach the appropriate area and therefore I placed an ear wick in your ear canals bilaterally to allow medication penetration. I would like you to place 5 drops in each ear twice daily. I have given you an additional prescription for the same medication, ofloxacin and you will start the 10-day course again today. We also discussed that I am going to treat you for an otitis media which I placed you on a medication called Omnicef you will take this twice daily for the next 10 days. Please take your antibiotics as prescribed for the full duration. As we discussed I would like you to follow-up with your primary care provider on Friday to be reevaluated to ensure that your ears are improving. If you have worsening of symptoms including but not limited to worsening pain, worsening swelling, pain behind your ears, pain moving into your face or throat you should be reevaluated in the emergency department. Please continue to take your home medication as previously prescribed, I have made no changes to your home medications. You can return to our or another Emergency Department as needed or for worsening symptoms of chest pain, shortness of breath, high fevers not relieved by acetaminophen (Tylenol) and/or ibuprofen (Motrin / Advil), chills, feeling of your heart fluttering or racing, persistent nausea and/or vomiting, vomiting up blood, blood in your stool, loss of consciousness, numbness, weakness or tingling in the arms or legs or change in color of the extremities, changes in mental status, persistent headache, blurry vision, loss of bladder / bowel control, if you are unable to follow up with your physician, or other any other care or concern. Thank You!     On behalf of the Emergency Department staff and team, I would like to thank you for

## 2023-10-05 NOTE — ED TRIAGE NOTES
Patient was here recently for an ear infection. She was prescribed ear drops and it hasn't gotten any better and now her ear seems swollen shut and she is unable to get medications in there.

## 2024-03-18 ENCOUNTER — APPOINTMENT (OUTPATIENT)
Dept: GENERAL RADIOLOGY | Facility: CLINIC | Age: 19
End: 2024-03-18
Payer: MEDICAID

## 2024-03-18 ENCOUNTER — HOSPITAL ENCOUNTER (EMERGENCY)
Facility: CLINIC | Age: 19
Discharge: HOME | End: 2024-03-19
Attending: EMERGENCY MEDICINE
Payer: MEDICAID

## 2024-03-18 DIAGNOSIS — S32.10XA CLOSED FRACTURE OF SACRUM AND COCCYX, INITIAL ENCOUNTER (HCC): ICD-10-CM

## 2024-03-18 DIAGNOSIS — S32.110A CLOSED NONDISPLACED ZONE I FRACTURE OF SACRUM, INITIAL ENCOUNTER (HCC): Primary | ICD-10-CM

## 2024-03-18 DIAGNOSIS — S32.2XXA CLOSED FRACTURE OF SACRUM AND COCCYX, INITIAL ENCOUNTER (HCC): ICD-10-CM

## 2024-03-18 PROCEDURE — 96372 THER/PROPH/DIAG INJ SC/IM: CPT

## 2024-03-18 PROCEDURE — 99284 EMERGENCY DEPT VISIT MOD MDM: CPT

## 2024-03-18 PROCEDURE — 6360000002 HC RX W HCPCS: Performed by: EMERGENCY MEDICINE

## 2024-03-18 PROCEDURE — 72220 X-RAY EXAM SACRUM TAILBONE: CPT

## 2024-03-18 RX ORDER — KETOROLAC TROMETHAMINE 30 MG/ML
30 INJECTION, SOLUTION INTRAMUSCULAR; INTRAVENOUS ONCE
Status: COMPLETED | OUTPATIENT
Start: 2024-03-18 | End: 2024-03-18

## 2024-03-18 RX ADMIN — KETOROLAC TROMETHAMINE 30 MG: 30 INJECTION, SOLUTION INTRAMUSCULAR at 22:46

## 2024-03-18 ASSESSMENT — PAIN - FUNCTIONAL ASSESSMENT: PAIN_FUNCTIONAL_ASSESSMENT: 0-10

## 2024-03-18 ASSESSMENT — PAIN SCALES - GENERAL: PAINLEVEL_OUTOF10: 8

## 2024-03-19 VITALS
WEIGHT: 107 LBS | HEART RATE: 70 BPM | SYSTOLIC BLOOD PRESSURE: 114 MMHG | RESPIRATION RATE: 18 BRPM | HEIGHT: 65 IN | TEMPERATURE: 98.7 F | OXYGEN SATURATION: 100 % | BODY MASS INDEX: 17.83 KG/M2 | DIASTOLIC BLOOD PRESSURE: 59 MMHG

## 2024-03-19 ASSESSMENT — PAIN - FUNCTIONAL ASSESSMENT: PAIN_FUNCTIONAL_ASSESSMENT: 0-10

## 2024-03-19 ASSESSMENT — PAIN SCALES - GENERAL: PAINLEVEL_OUTOF10: 7

## 2024-03-19 NOTE — DISCHARGE INSTRUCTIONS
You have a nondisplaced sacral fracture and also a fracture of your proximal coccyx we are recommending that you see orthopedics for this within 1 to 2 days have your family doctor refer you.  Also we are recommending that you sit on a doughnut when you are sitting and do not sit for prolonged periods of time take ibuprofen and Tylenol together for pain.  Return back to the ED if worse.

## 2024-03-19 NOTE — ED PROVIDER NOTES
eMERGENCY dEPARTMENT eNCOUnter      Pt Name: Yarelis Farr  MRN: [id number]  Birthdate 2005  Date of evaluation: 3/18/2024      CHIEF COMPLAINT       Chief Complaint   Patient presents with    Back Pain     Pt states she fell down 8 steps and hit her tailbone couple days ago. No relief with OTC Motrin/Tylenol. Gait steady with ambulation to room.           HISTORY OF PRESENT ILLNESS    Yarelis Farr is a 18 y.o. female who presents to the emergency department for evaluation of injury sustained when she fell down 6-8 stairs 3 days ago and landed on her coccyx.  Patient is ambulatory has no neurologic complaints.  Has been taking ibuprofen but she has not given her much relief.  She has not gone to see her family doctor.    REVIEW OF SYSTEMS     Constitutional: No fevers or chills  HEENT: No sore throat, rhinorrhea, or earache  Eyes: No blurry vision or double vision no drainage  Cardiovascular: No chest pain or tachycardia  Respiratory: No wheezing or shortness of breath no cough  Gastrointestinal: No nausea, vomiting, diarrhea, constipation, or abdominal pain   : No hematuria or dysuria  Musculoskeletal: No swelling, coccygeal pain  Skin: No rash   Neurological: No focal neurologic complaints, paresthesias, weakness, or headache    PAST MEDICAL HISTORY    has a past medical history of Constipation and Depression.    SURGICAL HISTORY      has a past surgical history that includes Hymenectomy and Tonsillectomy.    CURRENT MEDICATIONS       Previous Medications    ACETAMINOPHEN (TYLENOL) 500 MG TABLET    Take 2 tablets by mouth every 6 hours as needed for Pain or Fever    BUPROPION HCL (WELLBUTRIN PO)    Take 150 mg by mouth    CYCLOBENZAPRINE (FLEXERIL) 5 MG TABLET    Take 1 tablet by mouth nightly as needed    DICYCLOMINE (BENTYL) 10 MG CAPSULE    Take 1 capsule by mouth 3 times daily as needed    DROSPIRENONE-ETHINYL ESTRADIOL (ONEYDA) 3-0.02 MG PER TABLET    Take 1 tablet by mouth daily    DULOXETINE

## 2024-03-20 ENCOUNTER — TELEPHONE (OUTPATIENT)
Dept: ORTHOPEDIC SURGERY | Age: 19
End: 2024-03-20

## 2024-03-20 ENCOUNTER — OFFICE VISIT (OUTPATIENT)
Dept: ORTHOPEDIC SURGERY | Age: 19
End: 2024-03-20
Payer: MEDICAID

## 2024-03-20 VITALS — RESPIRATION RATE: 18 BRPM | BODY MASS INDEX: 19.06 KG/M2 | WEIGHT: 114.4 LBS | HEIGHT: 65 IN

## 2024-03-20 DIAGNOSIS — S32.10XA CLOSED FRACTURE OF SACRUM, UNSPECIFIED FRACTURE MORPHOLOGY, INITIAL ENCOUNTER (HCC): Primary | ICD-10-CM

## 2024-03-20 PROCEDURE — 99204 OFFICE O/P NEW MOD 45 MIN: CPT | Performed by: PHYSICIAN ASSISTANT

## 2024-03-20 RX ORDER — TRAMADOL HYDROCHLORIDE 50 MG/1
50 TABLET ORAL EVERY 6 HOURS PRN
Qty: 28 TABLET | Refills: 0 | Status: SHIPPED | OUTPATIENT
Start: 2024-03-20 | End: 2024-03-27

## 2024-03-20 ASSESSMENT — ENCOUNTER SYMPTOMS
CONSTIPATION: 0
ABDOMINAL PAIN: 0
SHORTNESS OF BREATH: 0
DIARRHEA: 0
COUGH: 0
ABDOMINAL DISTENTION: 0
COLOR CHANGE: 0
VOMITING: 0
RESPIRATORY NEGATIVE: 1
NAUSEA: 0
GASTROINTESTINAL NEGATIVE: 1
APNEA: 0
CHEST TIGHTNESS: 0

## 2024-03-20 NOTE — TELEPHONE ENCOUNTER
Called and Relayed the message per Nadege to the patient. She expressed understanding and will follow-up accordingly.

## 2024-03-20 NOTE — TELEPHONE ENCOUNTER
I did not speak to her at all about urinating we were talking about defecation.  She had a significant stool burden on x-ray and we talked about MiraLAX.  If she is having burning while urinating she would need to follow-up with her PCP or the urgent care to make sure she does not have a urinary tract infection.

## 2024-03-20 NOTE — TELEPHONE ENCOUNTER
Patient saw Nadege earlier today in clinic and was told to call if she has any pain when urinating. She states she is now having pain when urinating. Please advise. Her call back number is [phone]. Thank you

## 2024-03-20 NOTE — PROGRESS NOTES
reviewed in the patient's chart. I agree with the documentation provided by other staff and have reviewed their documentation prior to providing my signature indicating agreement.  Vitals:   Resp 18   Ht 1.651 m (5' 5\")   Wt 51.9 kg (114 lb 6.4 oz)   LMP 03/17/2024   BMI 19.04 kg/m²  Body mass index is 19.04 kg/m².  Physical Examination:     Orthopedics:    GENERAL: Alert and oriented X3 in no acute distress.  SKIN: Intact without lesions or ulcerations.  NEURO: Musculoskeletal and axillary nerves intact to sensory and motor testing.  VASC: Capillary refill is less than 3 seconds.    Fracture:    LOCATION: Sacrum/coccyx  SITE: Distal neurocirculatory status is intact.  EXAM: Sensation is intact to light touch, there is full motor function of the extremity.  PALP: fracture site is palpated with moderate pain.   ROM: Low back pain with limited range of motion  Assessment:     1. Closed fracture of sacrum, unspecified fracture morphology, initial encounter ([facility])      Procedures:    Procedure: no  Radiology:   XR SACRUM COCCYX (MIN 2 VIEWS)    Result Date: 3/18/2024  EXAMINATION: THREE XRAY VIEWS OF THE SACRUM/COCCYX 3/18/2024 11:01 pm COMPARISON: None. HISTORY: ORDERING SYSTEM PROVIDED HISTORY: fall TECHNOLOGIST PROVIDED HISTORY: fall Reason for Exam: Fell down steps landing on coccyx 3 days ago. FINDINGS: Pelvic alignment is maintained.  Lucency through the distal sacrum/proximal coccyx is noted on the lateral projection.  Remainder of the pelvis and visualized proximal femora otherwise appear intact.  Moderate stool burden in the visualized colon and rectum.     1. Nondisplaced fracture through the inferior sacrum/proximal coccyx. 2. Moderate stool burden in the visualized colon and rectum.      Plan:   Fracture Treatment : I reviewed the X-ray with the patient and I informed them that the x-ray shows a nondisplaced fracture of the inferior sacrum/proximal coccyx. We discussed the etiologies and natural histories

## 2024-04-30 DIAGNOSIS — S32.10XA CLOSED FRACTURE OF SACRUM, UNSPECIFIED FRACTURE MORPHOLOGY, INITIAL ENCOUNTER (HCC): Primary | ICD-10-CM

## 2024-05-01 NOTE — PROGRESS NOTES
negative Diaz's reflex, negative diadochokinesis test, negative Babinski, negative Clonus,  negative Lhermitte's sign.  Napoleon's signs is not present.     Assessment:     1. Closed fracture of sacrum, unspecified fracture morphology, initial encounter ([facility])    2. Acute midline low back pain without sciatica      Procedures:    Procedure: no  Radiology:   XR SACRUM COCCYX (MIN 2 VIEWS)    Result Date: 5/2/2024  History: Coccyx/sacral fracture  Findings: Coccyx/sacral X-RAY  Please including AP and lateral were obtained today and reveal a nondisplaced fracture through the inferior sacrum/proximal coccyx.  There is no malalignment.  No radiopaque foreign body/tumors. Interval healing Since previous x-ray on 3/18/2024.  Impression: Nondisplaced inferior sacrum/proximal coccyx fracture      Spine x-rays reviewed but will be read by Dr. Tomasz Duque, DO.  No obvious abnormalities noted  Plan:   Fracture Treatment : I reviewed the x-ray with the patient and I informed them that the fracture does show interval healing.  We opted to do lumbar x-rays today since the patient is having more low back pain today to rule out any type of a fracture.  We discussed the etiologies and natural histories of inferior sacrum/proximal coccyx fracture and low back pain. We discussed the various treatment alternatives including anti-inflammatory medications, physical therapy, injections, further imaging studies and as a last result surgery. During today's visit, we discussed that she really did not have much pain when I palpated her tailbone but she did have pain in her low back with range of motion.  I think she would benefit from going to physical therapy for range of motion and strengthening of her back.  A lot of times when people break the coccyx they end up sitting incorrectly because of the pain and end of aggravating the low back the patient  musculature as well as sometimes the sciatic nerve.  The patient has opted for going

## 2024-05-02 ENCOUNTER — OFFICE VISIT (OUTPATIENT)
Dept: ORTHOPEDIC SURGERY | Age: 19
End: 2024-05-02
Payer: MEDICAID

## 2024-05-02 VITALS — RESPIRATION RATE: 16 BRPM | HEIGHT: 65 IN | OXYGEN SATURATION: 98 % | WEIGHT: 108.2 LBS | BODY MASS INDEX: 18.03 KG/M2

## 2024-05-02 DIAGNOSIS — S32.10XA CLOSED FRACTURE OF SACRUM, UNSPECIFIED FRACTURE MORPHOLOGY, INITIAL ENCOUNTER (HCC): Primary | ICD-10-CM

## 2024-05-02 DIAGNOSIS — M54.50 ACUTE MIDLINE LOW BACK PAIN WITHOUT SCIATICA: ICD-10-CM

## 2024-05-02 PROCEDURE — 99214 OFFICE O/P EST MOD 30 MIN: CPT | Performed by: PHYSICIAN ASSISTANT

## 2024-05-02 RX ORDER — QUETIAPINE FUMARATE 100 MG/1
TABLET, FILM COATED ORAL
COMMUNITY
Start: 2024-04-19

## 2024-05-02 RX ORDER — BUPROPION HYDROCHLORIDE 150 MG/1
TABLET ORAL
COMMUNITY
Start: 2024-03-12

## 2024-05-02 ASSESSMENT — ENCOUNTER SYMPTOMS
CHEST TIGHTNESS: 0
COUGH: 0
ABDOMINAL DISTENTION: 0
DIARRHEA: 0
ABDOMINAL PAIN: 0
GASTROINTESTINAL NEGATIVE: 1
APNEA: 0
RESPIRATORY NEGATIVE: 1
COLOR CHANGE: 0
SHORTNESS OF BREATH: 0
CONSTIPATION: 0
VOMITING: 0
NAUSEA: 0

## 2024-05-02 NOTE — PATIENT INSTRUCTIONS
[facility] Orthopedics and Sports Medicine    Nadege Viveros PA-C, ATC    Over the counter anti-inflammatory protocol (NSAIDS)    You may take the following over the counter medication for only 10-14 days to  reduce inflammation.    If you develop an upset stomach, diarrhea, heartburn/GERD symptoms   discontinue immediately.    If you need the medication on a long-term basis >greater than 10-14 days. Please contact your family doctor/PCP to discuss your options as you   will require ongoing medical monitoring.             Over the Counter/OTC             Ibuprofen/Advil/Motrin                                                                                                            200 mg tablets                  3-4 tables 3 times a day with food             OR            Naproxen Sodium, Aleve                    220 mg tablets          2 tablets 2 times a day with food           You May Add                           Tylenol extra strength, Acetaminophen           500 mg tablets               2 tablets every 8 hours    You may alternate with the NSAIDS for pain.   NO more than 3000 mg of Tylenol/acetaminophen in 24 hours. '  Look at any OTC medications for added  Tylenol/acetaminophen--cold/sinus/flu medication.

## 2024-05-06 ENCOUNTER — HOSPITAL ENCOUNTER (OUTPATIENT)
Dept: PHYSICAL THERAPY | Facility: CLINIC | Age: 19
Setting detail: THERAPIES SERIES
Discharge: HOME | End: 2024-05-06
Payer: MEDICAID

## 2024-05-06 PROCEDURE — 97161 PT EVAL LOW COMPLEX 20 MIN: CPT

## 2024-05-06 PROCEDURE — 97110 THERAPEUTIC EXERCISES: CPT

## 2024-05-06 NOTE — CONSULTS
[facility]  Outpatient Rehabilitation &  Therapy  [address]   [address]   P: [phone]  F: [phone]      Physical Therapy Spine Evaluation    Date:  2024  Patient: Yarelis Farr     : 2005  MRN: [id number]  Physician: Nadege MELENDEZ    Insurance: [facility] Medicaid (30 v)  Medical Diagnosis: LBP, coccyx/sacrum fracture   Rehab Codes: S32.10Xa, M54.50, M62.81 (Muscle Weakness), M62.9 (Disorder of Muscle), M79.1 (Myalgia), Z73.6 (Limitation of ADLs)   Onset Date: 3-15-24      Next 's appt.: -      Subjective:   CC/HPI: Pt is a 18 year old female with coccyx/sacral fracture on 3-15-24 from a fall down the steps.  Pain has improved in the coccyx but pain is more focused in the lower back currently. Bilat L>R lower back pain noted with activity.       PMHx:   [] Unremarkable               [x] Refer to full medical chart  In [facility]         Radiology: Date Performed: Results:     [x] X-RAY  XR SACRUM COCCYX (MIN 2 VIEWS)     Result Date: 2024  History: Coccyx/sacral fracture  Findings: Coccyx/sacral X-RAY  Please including AP and lateral were obtained today and reveal a nondisplaced fracture through the inferior sacrum/proximal coccyx.  There is no malalignment.  No radiopaque foreign body/tumors. Interval healing Since previous x-ray on 3/18/2024.  Impression: Nondisplaced inferior sacrum/proximal coccyx fracture     [x] MRI     [] Other              Fall Risk:      [x] None   [] PRESENT/See Islas Scale   Medications: [x] Refer to full medical record [] None [] Other:  Allergies:      [x] Refer to full medical record [] None [] Other:        ADL/IADL [x] Previously independent with all [x] Currently independent with all Who currently assists the patient with task     [] Previously independent with all except: [] Currently independent with all except:     Bathing  [] Assist [] Assist     Dress/grooming [] Assist [] Assist     Transfer/mobility [] Assist [] Assist

## 2024-05-10 ENCOUNTER — HOSPITAL ENCOUNTER (OUTPATIENT)
Dept: PHYSICAL THERAPY | Facility: CLINIC | Age: 19
Setting detail: THERAPIES SERIES
Discharge: HOME | End: 2024-05-10
Payer: MEDICAID

## 2024-05-10 PROCEDURE — 97110 THERAPEUTIC EXERCISES: CPT

## 2024-05-10 NOTE — FLOWSHEET NOTE
[]      3. ? Strength: Increase LE MMT to 5/5 throughout to ease functional limitations and mobility  []  []  []      4. Independent with Home Exercise Programs []  []  []      5. Eliminate somatic dysfunctions to decrease pain symptoms  []  []  []        []  []  []      Date Addressed:            LTG: To be met in 20 treatments           1. Improve score on assessment tool Oswestry from 26% impairment to less than 10% impairment  []  []  []      2. Reduce pain levels to 1/10 or less with ADLs []  []  []        []  []  []                    Pt. Education:  [x] Yes  [] No  [x] Reviewed Prior HEP/Ed  Method of Education: [] Verbal  [] Demo  [] Written  Comprehension of Education:  [x] Verbalizes understanding.  [x] Demonstrates understanding.  [] Needs review.  [] Demonstrates/verbalizes HEP/Ed previously given.     Plan: [x] Continue current frequency toward long and short term goals.    [x] Specific Instructions for subsequent treatments: add 4 way hip with resistance, add resistance to all exercise as tolerated.  Stretches, strengthening, manual as needed       Time In:0900            Time Out: 0955    Electronically signed by:  Abhi Diaz, PT

## 2024-05-15 ENCOUNTER — HOSPITAL ENCOUNTER (OUTPATIENT)
Dept: PHYSICAL THERAPY | Facility: CLINIC | Age: 19
Setting detail: THERAPIES SERIES
Discharge: HOME | End: 2024-05-15
Payer: MEDICAID

## 2024-05-15 PROCEDURE — 97110 THERAPEUTIC EXERCISES: CPT

## 2024-05-15 NOTE — FLOWSHEET NOTE
carryover. Pain decreased to  after PT session. Will continue to progress strength and flexibility program next visit.       [] No change.     [] Other:  [x] Patient would benefit from skilled physical therapy services in order to improve ROM, flexibility, strength and allow patient to return to normal mobility for work.     STG/LTG           MET NOT MET ON-  GOING  Details    Date Addressed:            STG: To be met in 10 treatments            1. ? Pain: Decrease pain levels to 3/10 with ADLs []  []  []      2. ? ROM: Increase LE flexibility and AROM limitations throughout to equal bilat to reduce difficulty with ADLs []  []  []      3. ? Strength: Increase LE MMT to 5/5 throughout to ease functional limitations and mobility  []  []  []      4. Independent with Home Exercise Programs []  []  []      5. Eliminate somatic dysfunctions to decrease pain symptoms  []  []  []        []  []  []      Date Addressed:            LTG: To be met in 20 treatments           1. Improve score on assessment tool Oswestry from 26% impairment to less than 10% impairment  []  []  []      2. Reduce pain levels to 1/10 or less with ADLs []  []  []        []  []  []                    Education        Yes No     Patient Education Provided today  [x]  []      Reviewed established HEP  [x]  []               Comprehension of Education:         Verbalizes Understanding  [x]  []      Demonstrates understanding [x]  []      Needs review [x]  []      Demonstrates/verbalizes HEP/  Education previously given []  []                Specific education given  [x]  []  Form with govind/hip abd                  Med[employer] Program for HEP Given [x]  []  Access Code:   [email or web address]       [employer] updated as per exercise log today  []  [x]                     Plan: [x] Continue current frequency toward long and short term goals.          Time In: 10:00am              Time Out: 10:40am    Electronically signed by:  Anisha Lees PTA

## 2024-05-17 ENCOUNTER — APPOINTMENT (OUTPATIENT)
Dept: PHYSICAL THERAPY | Facility: CLINIC | Age: 19
End: 2024-05-17
Payer: MEDICAID

## 2024-05-22 ENCOUNTER — HOSPITAL ENCOUNTER (OUTPATIENT)
Dept: PHYSICAL THERAPY | Facility: CLINIC | Age: 19
Setting detail: THERAPIES SERIES
Discharge: HOME | End: 2024-05-22
Payer: MEDICAID

## 2024-05-22 PROCEDURE — 97110 THERAPEUTIC EXERCISES: CPT

## 2024-05-22 NOTE — FLOWSHEET NOTE
[x] [facility]  Outpatient Rehabilitation &  Therapy  [address]   P: [phone]  F: [phone]      Physical Therapy Daily Treatment Note    Date:  2024  Patient Name:  Yarelis Farr    :  2005 MRN: [id number]  Physician: Nadege MELENDEZ                                               Insurance: [facility] Medicaid (30 v)  Medical Diagnosis: LBP, coccyx/sacrum fracture                  Rehab Codes: S32.10Xa, M54.50, M62.81 (Muscle Weakness), M62.9 (Disorder of Muscle), M79.1 (Myalgia), Z73.6 (Limitation of ADLs)   Onset Date: 3-15-24     Mely Maloney Appt: 24   Visit# / total visits:      Cancels/No Shows: 0/0    Subjective:    Pain:  [x] Yes  [] No Location: L sided low back  Pain Rating: (0-10 scale) 7/10  Pain altered Tx:  [x] No  [] Yes  Action:  Comments:Patient arrives stating 7/10 pain \"so not that bad.\" Reports the last couple of days have been worse.     Objective:  Modalities:   Precautions: standard  Exercises:  Exercise     LBP  Coccyx/sacral fracture  Reps/ Time Weight/ Level Comments             Treadmill   10'               SB gastroc stretch  3x30\"     HS step stretch  3x30\"           Supine hip flexor stretch 1'ea     ER hip stretch  3x30\"     LTR x20  HEP   Pelvic tilt x20   HEP   Bridges x20   HEP   Side hip abd x20 [place] HEP   Side clamshells x20 [place] HEP          Balance board  5' L2     4-way hip  x10 [place]    TGym squats  x20 L20    TGym HR x20 L20                             Treatment Charges: Mins Units   []  Modalities     [x]  Ther Exercise 40 3   []  Manual Therapy     []  Ther Activities     []  Neuro Re-ed     []  Vasocompression     [] Gait     []  Other     Total Billable time 40 3       Assessment: [x] Progressing toward goals. Progressed standing program with no issues to report. Fatigue demonstrated with hip strengthening exercises. Patient demonstrates improved tolerance to mobility and exercises compared to last visit.

## 2024-05-24 ENCOUNTER — HOSPITAL ENCOUNTER (OUTPATIENT)
Dept: PHYSICAL THERAPY | Facility: CLINIC | Age: 19
Setting detail: THERAPIES SERIES
Discharge: HOME | End: 2024-05-24
Payer: MEDICAID

## 2024-05-24 PROCEDURE — 97110 THERAPEUTIC EXERCISES: CPT

## 2024-05-24 NOTE — FLOWSHEET NOTE
[x] [facility]  Outpatient Rehabilitation &  Therapy  [address]   P: [phone]  F: [phone]      Physical Therapy Daily Treatment Note    Date:  2024  Patient Name:  Yarelis Farr    :  2005 MRN: [id number]  Physician: Nadege MELENDEZ                                               Insurance: [facility] Medicaid (30 v)  Medical Diagnosis: LBP, coccyx/sacrum fracture                  Rehab Codes: S32.10Xa, M54.50, M62.81 (Muscle Weakness), M62.9 (Disorder of Muscle), M79.1 (Myalgia), Z73.6 (Limitation of ADLs)   Onset Date: 3-15-24     Mely Maloney Appt: 24     Visit# / total visits:      Cancels/No Shows: 0/0    Subjective:    Pain:  [x] Yes  [] No Location: L sided low back  Pain Rating: (0-10 scale) 7/10  Pain altered Tx:  [x] No  [] Yes  Action:  Comments: Patient arrived noting minimal pain upon arrival, notes most discomfort post treatments. Still notes discomfort due to \"moving things\" yesterday.    Objective:  Modalities:   Precautions: standard  Exercises:  Exercise     LBP  Coccyx/sacral fracture  Reps/ Time Weight/ Level Comments             Treadmill   10'               SB gastroc stretch  3x30\"     HS step stretch  3x30\"           Supine hip flexor stretch 1'ea     ER hip stretch  3x30\"     LTR x20  HEP   Pelvic tilt x20   HEP   Bridges x20   HEP   Side hip abd x20 [place] HEP   Side clamshells x20 [place] HEP          Balance board  5' L2     4-way hip  x10 [place]    TGym squats  x20 L20    TGym HR x20 L20                             Treatment Charges: Mins Units   []  Modalities     [x]  Ther Exercise 40 3   []  Manual Therapy     []  Ther Activities     []  Neuro Re-ed     []  Vasocompression     [] Gait     []  Other     Total Billable time 40 3       Assessment: [x] Progressing toward goals. Continued with strength program this date. Patient notes no complaint of pain during program this date with no increased symptoms stated. Will continue to

## 2024-05-29 ENCOUNTER — HOSPITAL ENCOUNTER (OUTPATIENT)
Dept: PHYSICAL THERAPY | Facility: CLINIC | Age: 19
Setting detail: THERAPIES SERIES
Discharge: HOME | End: 2024-05-29
Payer: MEDICAID

## 2024-05-29 PROCEDURE — 97110 THERAPEUTIC EXERCISES: CPT

## 2024-05-29 NOTE — FLOWSHEET NOTE
[x] [facility]  Outpatient Rehabilitation &  Therapy  [address]   P: [phone]  F: [phone]      Physical Therapy Daily Treatment Note    Date:  2024  Patient Name:  Yarelis Farr    :  2005 MRN: [id number]  Physician: Nadege MELENDEZ                                               Insurance: [facility] Medicaid (30 v)  Medical Diagnosis: LBP, coccyx/sacrum fracture                  Rehab Codes: S32.10Xa, M54.50, M62.81 (Muscle Weakness), M62.9 (Disorder of Muscle), M79.1 (Myalgia), Z73.6 (Limitation of ADLs)   Onset Date: 3-15-24     Mely Maloney Appt: 24     Visit# / total visits: /20     Cancels/No Shows: 0/0    Subjective:    Pain:  [x] Yes  [] No Location: L sided low back  Pain Rating: (0-10 scale) 7/10  Pain altered Tx:  [x] No  [] Yes  Action:  Comments: Patient arrived noting continued pain.     Objective:  Modalities:   Precautions: standard  Exercises:  Exercise     LBP  Coccyx/sacral fracture  Reps/ Time Weight/ Level Comments             Treadmill   10'               SB gastroc stretch  3x30\"     HS step stretch  3x30\"           Supine hip flexor stretch 1'ea     ER hip stretch  3x30\"     LTR x20  HEP   Pelvic tilt x20   HEP   Bridges x20   HEP   Side hip abd x20 [place] HEP   Side clamshells x20 [place] HEP          Balance board  5' L2     4-way hip  x10 [place]    TGym squats  x20 L20    TGym HR x20 L20                             Treatment Charges: Mins Units   []  Modalities     [x]  Ther Exercise 30 2   []  Manual Therapy     []  Ther Activities     []  Neuro Re-ed     []  Vasocompression     [] Gait     []  Other     Total Billable time 30 2       Assessment: [x] Progressing toward goals. No progressions made this date due to pain. Patient tearful throughout this date. Stressed importance of pain free HEP and stretching. Will advance HEP per patients tolerance.    [] No change.     [] Other:  [x] Patient would benefit from skilled physical

## 2024-05-31 ENCOUNTER — TELEPHONE (OUTPATIENT)
Dept: ORTHOPEDIC SURGERY | Age: 19
End: 2024-05-31

## 2024-05-31 ENCOUNTER — HOSPITAL ENCOUNTER (EMERGENCY)
Facility: CLINIC | Age: 19
Discharge: HOME | End: 2024-05-31
Attending: EMERGENCY MEDICINE
Payer: MEDICAID

## 2024-05-31 ENCOUNTER — HOSPITAL ENCOUNTER (OUTPATIENT)
Dept: PHYSICAL THERAPY | Facility: CLINIC | Age: 19
Setting detail: THERAPIES SERIES
Discharge: HOME | End: 2024-05-31
Payer: MEDICAID

## 2024-05-31 VITALS
HEART RATE: 73 BPM | RESPIRATION RATE: 16 BRPM | TEMPERATURE: 98.3 F | HEIGHT: 65 IN | SYSTOLIC BLOOD PRESSURE: 116 MMHG | BODY MASS INDEX: 18.33 KG/M2 | WEIGHT: 110 LBS | OXYGEN SATURATION: 100 % | DIASTOLIC BLOOD PRESSURE: 80 MMHG

## 2024-05-31 DIAGNOSIS — H92.03 OTALGIA OF BOTH EARS: ICD-10-CM

## 2024-05-31 DIAGNOSIS — J02.9 VIRAL PHARYNGITIS: Primary | ICD-10-CM

## 2024-05-31 LAB
SPECIMEN SOURCE: NORMAL
STREP A, MOLECULAR: NEGATIVE

## 2024-05-31 PROCEDURE — 87651 STREP A DNA AMP PROBE: CPT

## 2024-05-31 PROCEDURE — 97110 THERAPEUTIC EXERCISES: CPT

## 2024-05-31 PROCEDURE — 99283 EMERGENCY DEPT VISIT LOW MDM: CPT

## 2024-05-31 ASSESSMENT — PAIN DESCRIPTION - DESCRIPTORS
DESCRIPTORS_2: SORE
DESCRIPTORS: ACHING

## 2024-05-31 ASSESSMENT — PAIN DESCRIPTION - FREQUENCY: FREQUENCY: CONTINUOUS

## 2024-05-31 ASSESSMENT — ENCOUNTER SYMPTOMS
VOMITING: 0
ABDOMINAL PAIN: 0
NAUSEA: 0
SINUS PAIN: 0
COUGH: 0
SHORTNESS OF BREATH: 0
SORE THROAT: 1

## 2024-05-31 ASSESSMENT — PAIN DESCRIPTION - LOCATION
LOCATION_2: THROAT
LOCATION: EAR

## 2024-05-31 ASSESSMENT — PAIN SCALES - GENERAL: PAINLEVEL_OUTOF10: 6

## 2024-05-31 ASSESSMENT — PAIN DESCRIPTION - ORIENTATION: ORIENTATION: RIGHT;LEFT

## 2024-05-31 ASSESSMENT — PAIN DESCRIPTION - ONSET: ONSET: GRADUAL

## 2024-05-31 ASSESSMENT — PAIN DESCRIPTION - PAIN TYPE: TYPE: ACUTE PAIN

## 2024-05-31 ASSESSMENT — PAIN DESCRIPTION - INTENSITY: RATING_2: 7

## 2024-05-31 NOTE — DISCHARGE INSTRUCTIONS
Recommend supportive care including fluids rest Tylenol Motrin, take Zyrtec Flonase as needed.  Follow-up with PCP return to the ER for any new or worsening

## 2024-05-31 NOTE — DISCHARGE INSTR - COC
information and transfer of Yarelis Farr  is necessary for the continuing treatment of the diagnosis listed and that she requires {Admit to Appropriate Level of Care:90990} for {GREATER/LESS:549372398} 30 days.     Update Admission H&P: {CHP DME Changes in HandP:393011927}    PHYSICIAN SIGNATURE:  {Esignature:[id number]}

## 2024-05-31 NOTE — FLOWSHEET NOTE
[x] [facility]  Outpatient Rehabilitation &  Therapy  [address]   P: [phone]  F: [phone]      Physical Therapy Daily Treatment Note    Date:  2024  Patient Name:  Yarelis Farr    :  2005 MRN: [id number]  Physician: Nadege MELENDEZ                                               Insurance: [facility] Medicaid (30 v)  Medical Diagnosis: LBP, coccyx/sacrum fracture                  Rehab Codes: S32.10Xa, M54.50, M62.81 (Muscle Weakness), M62.9 (Disorder of Muscle), M79.1 (Myalgia), Z73.6 (Limitation of ADLs)   Onset Date: 3-15-24     Mely Maloney Appt: 24     Visit# / total visits:      Cancels/No Shows: 0/0    Subjective:    Pain:  [x] Yes  [] No Location: L sided low back  Pain Rating: (0-10 scale) 7/10  Pain altered Tx:  [x] No  [] Yes  Action:  Comments: Patient arrived stating no change in pain and only felt a little better after last visit but it didn't last. Reports she has a follow-up with her doctor next week  to discuss if she needs surgery.      Objective:  Modalities:   Precautions: standard  Exercises:  Exercise     LBP  Coccyx/sacral fracture  Reps/ Time Weight/ Level Comments             Treadmill   10'               SB gastroc stretch  3x30\"     HS step stretch  3x30\"           Supine hip flexor stretch 1'ea     ER hip stretch  3x30\"     LTR x20  HEP   Pelvic tilt x20   HEP   Bridges x20   HEP   Side hip abd x20 [place] HEP   Side clamshells x20 [place] HEP          Balance board  5' L2     4-way hip  x10 [place]    TGym squats  x20 L20    TGym HR x20 L20                             Treatment Charges: Mins Units   []  Modalities     [x]  Ther Exercise 30 2   []  Manual Therapy     []  Ther Activities     []  Neuro Re-ed     []  Vasocompression     [] Gait     []  Other     Total Billable time 30 2       Assessment: [x] Progressing toward goals. Continued program with no progressions made due to increased pain reported. Patient plans to

## 2024-05-31 NOTE — TELEPHONE ENCOUNTER
Patient called and stated she just went to bend over to put something down, heard a pop and now is in horrible pain. She says she is going to go to the ED today but wants to discuss surgery since she feels the PT is not helping. Please advise. Her call back number is [phone]. Thank you.

## 2024-05-31 NOTE — ED PROVIDER NOTES
[facility]  [address] [address]  Phone: [phone]      Pt Name: Yarelis Farr  MRN: [id number]  Birthdate 2005  Date of evaluation: 5/31/24    CHIEF COMPLAINT       Chief Complaint   Patient presents with    Otalgia    Pharyngitis       PAST MEDICAL HISTORY    has a past medical history of Constipation and Depression.    SURGICAL HISTORY      has a past surgical history that includes Hymenectomy and Tonsillectomy.    CURRENT MEDICATIONS       Previous Medications    ACETAMINOPHEN (TYLENOL) 500 MG TABLET    Take 2 tablets by mouth every 6 hours as needed for Pain or Fever    BUPROPION (WELLBUTRIN XL) 150 MG EXTENDED RELEASE TABLET        CYCLOBENZAPRINE (FLEXERIL) 5 MG TABLET    Take 1 tablet by mouth nightly as needed    DICYCLOMINE (BENTYL) 10 MG CAPSULE    Take 1 capsule by mouth 3 times daily as needed    DROSPIRENONE-ETHINYL ESTRADIOL (ONEYDA) 3-0.02 MG PER TABLET    Take 1 tablet by mouth daily    DULOXETINE (CYMBALTA) 20 MG EXTENDED RELEASE CAPSULE    Take 1 capsule by mouth daily    FEXOFENADINE HCL (ALLEGRA ALLERGY PO)    Take by mouth    FLUTICASONE (FLONASE) 50 MCG/ACT NASAL SPRAY    2 sprays by Each Nostril route daily    IBUPROFEN (ADVIL;MOTRIN) 800 MG TABLET    Take 1 tablet by mouth every 8 hours as needed for Pain    IBUPROFEN (IBU) 600 MG TABLET    Take 1 tablet by mouth every 6 hours as needed for Pain    QUETIAPINE (SEROQUEL) 100 MG TABLET        SENNA (SENOKOT) 8.6 MG TABLET    Take 1 tablet by mouth daily       ALLERGIES     is allergic to nitrofurantoin and sulfa antibiotics.    Vitals:    05/31/24 1140   BP: 116/80   Pulse: 73   Resp: 16   Temp: 98.3 °F (36.8 °C)   TempSrc: Oral   SpO2: 100%   Weight: 49.9 kg (110 lb)   Height: 1.651 m (5' 5\")            FINAL IMPRESSION      1. Viral pharyngitis    2. Otalgia of both ears          DISPOSITION/PLAN   DISPOSITION Decision To Discharge 05/31/2024 12:12:14 PM        PATIENT REFERRED TO:  Yamilet Stratton,

## 2024-05-31 NOTE — ED PROVIDER NOTES
[facility]  [address] [address]  Phone: [phone]        Pt Name: Yarelis Farr  MRN: [id number]  Birthdate 2005  Date of evaluation: 5/31/24    CHIEFCOMPLAINT       Chief Complaint   Patient presents with    Otalgia    Pharyngitis       HISTORY OF PRESENT ILLNESS (Location/Symptom, Timing/Onset, Context/Setting, Quality, Duration, Modifying Factors, Severity)      Yarelis Farr is a 18 y.o. female with no pertinent PMH who presents to the ED via private auto with concern for ear pain, sore throat.  Patient had onset of symptoms beginning 1 week ago.  Patient reports she has had bilateral ear pain ongoing for 1 week.  Reports sore throat started today.  Additionally has mild nasal congestion.  Denies any cough.  No fever present.  Reports possible sick contacts to her friend who had recent upper respiratory infection.  Treatments tried include NyQuil without relief    PAST MEDICAL / SURGICAL / SOCIAL / FAMILY HISTORY     PMH:  has a past medical history of Constipation and Depression.  Surgical History:  has a past surgical history that includes Hymenectomy and Tonsillectomy.  Social History:  reports that she has never smoked. She has never been exposed to tobacco smoke. She has never used smokeless tobacco. She reports that she does not drink alcohol and does not use drugs.  Family History: has no family status information on file.    family history is not on file.  Psychiatric History: None    Allergies: Nitrofurantoin and Sulfa antibiotics    Home Medications:   Prior to Admission medications    Medication Sig Start Date End Date Taking? Authorizing Provider   QUEtiapine (SEROQUEL) 100 MG tablet  4/19/24   ProviderAbilio MD   buPROPion (WELLBUTRIN XL) 150 MG extended release tablet  3/12/24   Abilio Gleason MD   acetaminophen (TYLENOL) 500 MG tablet Take 2 tablets by mouth every 6 hours as needed for Pain or Fever 10/5/23 10/12/23  Xin Moe,

## 2024-06-04 ENCOUNTER — HOSPITAL ENCOUNTER (OUTPATIENT)
Dept: PHYSICAL THERAPY | Facility: CLINIC | Age: 19
Setting detail: THERAPIES SERIES
Discharge: HOME | End: 2024-06-04
Payer: MEDICAID

## 2024-06-04 PROCEDURE — 97110 THERAPEUTIC EXERCISES: CPT

## 2024-06-04 NOTE — FLOWSHEET NOTE
[x] [facility]  Outpatient Rehabilitation &  Therapy  [address]   P: [phone]  F: [phone]      Physical Therapy Daily Treatment Note    Date:  2024  Patient Name:  Yarelis Farr    :  2005 MRN: [id number]  Physician: Nadege MELENDEZ                                               Insurance: [facility] Medicaid (30 v)  Medical Diagnosis: LBP, coccyx/sacrum fracture                  Rehab Codes: S32.10Xa, M54.50, M62.81 (Muscle Weakness), M62.9 (Disorder of Muscle), M79.1 (Myalgia), Z73.6 (Limitation of ADLs)   Onset Date: 3-15-24     Mely Maloney Appt: 24     Visit# / total visits:      Cancels/No Shows: 0/0    Subjective:    Pain:  [x] Yes  [] No Location: L sided low back  Pain Rating: (0-10 scale) 7/10  Pain altered Tx:  [x] No  [] Yes  Action:  Comments: Patient arrived stating no change in pain and has her follow-up with her doctor on     Objective:  Modalities:   Precautions: standard  Exercises:  Exercise     LBP  Coccyx/sacral fracture  Reps/ Time Weight/ Level Comments             Treadmill   10'               SB gastroc stretch  3x30\"     HS step stretch  3x30\"           Supine hip flexor stretch 1'ea     ER hip stretch  3x30\"     LTR x20  HEP   Pelvic tilt x20   HEP   Bridges x20   HEP   Side hip abd x20 [place] HEP   Side clamshells x20 [place] HEP          Balance board  5' L2     4-way hip  x15 [place]    TGym squats  x20 L20    TGym HR x20 L20                             Treatment Charges: Mins Units   []  Modalities     [x]  Ther Exercise 30 2   []  Manual Therapy     []  Ther Activities     []  Neuro Re-ed     []  Vasocompression     [] Gait     []  Other     Total Billable time 30 2       Assessment: [] Progressing toward goals.         [x] No change. No progressions made due to continued pain noted. Good recall on all exercises. Plans to have her follow-up with her doctor to discuss possible surgery. Plans to call to call PT to notify

## 2024-06-05 ENCOUNTER — OFFICE VISIT (OUTPATIENT)
Dept: ORTHOPEDIC SURGERY | Age: 19
End: 2024-06-05
Payer: MEDICAID

## 2024-06-05 VITALS — HEIGHT: 65 IN | BODY MASS INDEX: 18.33 KG/M2 | WEIGHT: 110 LBS

## 2024-06-05 DIAGNOSIS — M54.50 ACUTE MIDLINE LOW BACK PAIN WITHOUT SCIATICA: Primary | ICD-10-CM

## 2024-06-05 DIAGNOSIS — S32.10XD CLOSED FRACTURE OF SACRUM WITH ROUTINE HEALING, UNSPECIFIED PORTION OF SACRUM, SUBSEQUENT ENCOUNTER: ICD-10-CM

## 2024-06-05 PROCEDURE — 99213 OFFICE O/P EST LOW 20 MIN: CPT

## 2024-06-05 ASSESSMENT — ENCOUNTER SYMPTOMS
VOMITING: 0
NAUSEA: 0
BACK PAIN: 1
COLOR CHANGE: 0

## 2024-06-05 NOTE — PROGRESS NOTES
nondisplaced fracture through the inferior sacrum/proximal coccyx.  There is no malalignment.  No radiopaque foreign body/tumors. Interval healing Since previous x-ray on 3/18/2024.     Impression: Nondisplaced inferior sacrum/proximal coccyx fracture       Assessment:      1. Acute midline low back pain without sciatica    2. Closed fracture of sacrum with routine healing, unspecified portion of sacrum, subsequent encounter       Plan:   Assessment & Plan     The patient presents today for follow-up of her nondisplaced sacrum fracture.  The fracture occurred on 3/14/2024.  She was last seen in the office 1 month ago and at that time had x-rays of the sacrum as well as the lumbar spine.  She has been dealing with more lumbar spine pain.  X-rays of the lumbar spine were taken at the last visit which demonstrate severe degenerative changes at L5-S1.  These images were reviewed with patient today.  The patient has recently completed 4 weeks of physical therapy and unfortunately has not found any relief with this.  She is still experiencing significant pain daily and the pain is mostly along the lower back.  She is not having any radicular pain or weakness.  I do not believe that the pain is coming from the sacral fracture.  At this time the patient would like to proceed with an MRI of the lumbar spine for further evaluation.  I will also include an MRI of the sacrum and coccyx.  I will be referring the patient to Dr. Nickerson for further evaluation of the back pain.  She was informed that she needs to have the MRI done prior to seeing him.  She verbalized understanding of this.     Follow up:Return in about 1 week (around 6/12/2024), or if symptoms worsen or fail to improve.        No orders of the defined types were placed in this encounter.        Orders Placed This Encounter   Procedures    MRI LUMBAR SPINE WO CONTRAST     Standing Status:   Future     Standing Expiration Date:   6/5/2025    MRI SACRUM COCCYX WO

## 2024-06-10 ENCOUNTER — TELEPHONE (OUTPATIENT)
Dept: ORTHOPEDIC SURGERY | Age: 19
End: 2024-06-10

## 2024-06-10 NOTE — TELEPHONE ENCOUNTER
I contacted the patient to reschedule her appointment with Dr. Nickerson.  Her MRI's are scheduled for 6/17/24.

## 2024-06-10 NOTE — TELEPHONE ENCOUNTER
Pt called in regarding recent appt she had with Kirstin on 06/05/24    States her insurance is asking for copies on the MRI referrals Kirstin put in for her to get faxed over to them.        Please call pt back @ [phone] if we need her to get the insurance fax #.

## 2024-06-10 NOTE — TELEPHONE ENCOUNTER
Pt called in to see if Kirstin would change the status on the MRI to stat so she can get them done as soon as possible. Both are currently scheduled for 06/17/24.        Please call pt to advise @ [phone]

## 2024-06-17 ENCOUNTER — HOSPITAL ENCOUNTER (OUTPATIENT)
Dept: MRI IMAGING | Age: 19
Discharge: HOME | End: 2024-06-19
Payer: MEDICAID

## 2024-06-17 DIAGNOSIS — S32.10XD CLOSED FRACTURE OF SACRUM WITH ROUTINE HEALING, UNSPECIFIED PORTION OF SACRUM, SUBSEQUENT ENCOUNTER: ICD-10-CM

## 2024-06-17 DIAGNOSIS — M54.50 ACUTE MIDLINE LOW BACK PAIN WITHOUT SCIATICA: ICD-10-CM

## 2024-06-17 PROCEDURE — 72148 MRI LUMBAR SPINE W/O DYE: CPT

## 2024-06-17 PROCEDURE — 72195 MRI PELVIS W/O DYE: CPT

## 2024-06-20 ENCOUNTER — TELEPHONE (OUTPATIENT)
Dept: ADMINISTRATIVE | Age: 19
End: 2024-06-20

## 2024-06-20 ENCOUNTER — OFFICE VISIT (OUTPATIENT)
Dept: ORTHOPEDIC SURGERY | Age: 19
End: 2024-06-20
Payer: MEDICAID

## 2024-06-20 VITALS — RESPIRATION RATE: 14 BRPM | HEIGHT: 65 IN | BODY MASS INDEX: 18.33 KG/M2 | WEIGHT: 110 LBS

## 2024-06-20 DIAGNOSIS — S32.10XD CLOSED FRACTURE OF SACRUM WITH ROUTINE HEALING, UNSPECIFIED PORTION OF SACRUM, SUBSEQUENT ENCOUNTER: ICD-10-CM

## 2024-06-20 DIAGNOSIS — M54.50 CHRONIC MIDLINE LOW BACK PAIN WITHOUT SCIATICA: Primary | ICD-10-CM

## 2024-06-20 DIAGNOSIS — G89.29 CHRONIC MIDLINE LOW BACK PAIN WITHOUT SCIATICA: Primary | ICD-10-CM

## 2024-06-20 PROCEDURE — 99213 OFFICE O/P EST LOW 20 MIN: CPT | Performed by: ORTHOPAEDIC SURGERY

## 2024-06-20 RX ORDER — CYCLOBENZAPRINE HCL 10 MG
10 TABLET ORAL 3 TIMES DAILY PRN
Qty: 90 TABLET | Refills: 1 | Status: SHIPPED | OUTPATIENT
Start: 2024-06-20 | End: 2024-09-18

## 2024-06-20 RX ORDER — CYCLOBENZAPRINE HCL 10 MG
10 TABLET ORAL 3 TIMES DAILY PRN
Qty: 90 TABLET | Refills: 1 | Status: SHIPPED | OUTPATIENT
Start: 2024-06-20

## 2024-06-20 NOTE — PROGRESS NOTES
Patient ID: Yarelis Farr is a 18 y.o. female    Chief Compliant:  Chief Complaint   Patient presents with    Lower Back Pain        Diagnostic imaging:  MRI sacrum patient with distal sacral transverse fracture largely healing    MRI lumbar spine some L5-S1 disc dehydration otherwise age-appropriate      Assessment and Plan:  1. Chronic midline low back pain without sciatica    2. Closed fracture of sacrum with routine healing, unspecified portion of sacrum, subsequent encounter        Patient complains of a flare-up of 10 year history of chronic low back pain after she fell and fractured her sacrum and coccyx a few months ago. She reports that the pain from her fractures has largely resolved.     PT lumbar spine    If PT fails, may consider referral to pain management for LESI's    Follow up 6 weeks    HPI:  This is a 18 y.o. female who presents to the clinic today as a new patient for low back evaluation.     Patient complains of a flare-up of left sided low back pain with some tailbone pain post fall a few months ago. She reports that her tailbone pain has largely resolved.     She reports that she has had chronic low back pain that has been ongoing since she was 8 and was aggravated with her sacral and coccyx fracture.    Patient reports that she has completed PT after her initial injury to her sacrum and coccyx.       Review of Systems   All other systems reviewed and are negative.      Past History:    Current Outpatient Medications:     QUEtiapine (SEROQUEL) 100 MG tablet, , Disp: , Rfl:     buPROPion (WELLBUTRIN XL) 150 MG extended release tablet, , Disp: , Rfl:     acetaminophen (TYLENOL) 500 MG tablet, Take 2 tablets by mouth every 6 hours as needed for Pain or Fever, Disp: 56 tablet, Rfl: 0    ibuprofen (IBU) 600 MG tablet, Take 1 tablet by mouth every 6 hours as needed for Pain, Disp: 21 tablet, Rfl: 0    ibuprofen (ADVIL;MOTRIN) 800 MG tablet, Take 1 tablet by mouth every 8 hours as needed for

## 2024-06-26 ENCOUNTER — HOSPITAL ENCOUNTER (OUTPATIENT)
Dept: PHYSICAL THERAPY | Facility: CLINIC | Age: 19
Setting detail: THERAPIES SERIES
Discharge: HOME | End: 2024-06-26
Attending: ORTHOPAEDIC SURGERY
Payer: MEDICAID

## 2024-06-26 PROCEDURE — 97161 PT EVAL LOW COMPLEX 20 MIN: CPT

## 2024-06-26 PROCEDURE — 97110 THERAPEUTIC EXERCISES: CPT

## 2024-06-26 NOTE — CONSULTS
Hamstring Stretch with Strap  - 1 x daily - 7 x weekly - 1 sets - 3 reps - 30sec hold    Comprehension of Education:  [x] Verbalizes understanding.  [x] Demonstrates understanding.  [] Needs Review.  [] Demonstrates/verbalizes understanding of HEP/Ed previously given.        Treatment Plan:  [x] Therapeutic Exercise   59187  [] Iontophoresis: 4 mg/mL Dexamethasone Sodium Phosphate  mAmin  61641   [x] Therapeutic Activity  29663 [] Vasopneumatic cold with compression  47118    [] Gait Training   80797 [] Ultrasound   80622   [x] Neuromuscular Re-education  83881 [] Electrical Stimulation Unattended  08362   [x] Manual Therapy  62288 [] Electrical Stimulation Attended  36760   [x] Instruction in HEP  [] Lumbar/Cervical Traction  36553   [] Aquatic Therapy   66344 [x] Cold/hotpack    [] Massage   44627      [] Dry Needling, 1 or 2 muscles  13801   [] Biofeedback, first 15 minutes   37889  [] Biofeedback, additional 15 minutes   24069 [] Dry Needling, 3 or more muscles  78530         Frequency:  2 x/week for 10 visits        Today’s Treatment:  Modalities:   Precautions:  Exercises:  Exercise Reps/ Time Weight/ Level Comments         Posterior pelvic tilt 10x5s     TA marching 10x ea     TA alt knee fallout 10x ea     Diaphragmatic breathing + hip ADD 10x ea   Squeeze ball with exhale   1 hand on chest and 1 hand on stomach   HS S with strap  30s ea      Other:    Specific Instructions for next treatment:  - Core and B hip strengthening  - Improve thoracolumbar mobility   - Global hip stretching (improve IR AROM, HS, gastroc/soleus)  - Manual to lumbar paraspinals and piriformis as needed  - CP/HP prn     Evaluation Complexity:  History (Personal factors, comorbidities) [] 0 [x] 1-2 [] 3+   Exam (limitations, restrictions) [] 1-2 [x] 3 [] 4+   Clinical presentation (progression) [x] Stable [] Evolving  [] Unstable   Decision Making [x] Low [] Moderate [] High    [x] Low Complexity [] Moderate Complexity [] High

## 2024-07-02 ENCOUNTER — HOSPITAL ENCOUNTER (OUTPATIENT)
Dept: PHYSICAL THERAPY | Facility: CLINIC | Age: 19
Setting detail: THERAPIES SERIES
Discharge: HOME | End: 2024-07-02
Attending: ORTHOPAEDIC SURGERY
Payer: MEDICAID

## 2024-07-02 PROCEDURE — 97110 THERAPEUTIC EXERCISES: CPT

## 2024-07-02 NOTE — FLOWSHEET NOTE
benefit from skilled physical therapy services in order to: meet goals listed below  STG: (to be met in 10 treatments)  Pt will reduce pain to less than or equal to 4/10 with ADLs  Pt will be able to achieve at least 35° B 90/90 SLR to demo improved B/R/L hamstrings flexibility to ease difficulty with daily ambulation   Pt to improve B hip IR AROM to at least 30° to demo improved tissue extensibility to assist with bed mobility   Pt to improve B hip strength to at least 4/5 throughout to promote tolerance with prolong standing/walking activities   Pt to report min to no pain with lumbar AROM in all directions to demo decreased muscular guarding/tension and improved core function  Patient to be independent with home exercise program as demonstrated by performance with correct form without cues.  Pt will improve Modified Oswestry score from 32% impaired to less than 20% impaired to demo improved functional mobility to participate in daily functional activities     Patient goals: \"to feel a little bit better\"    Pt. Education:  [x] Yes  [] No  [] Reviewed Prior HEP/Ed  Method of Education: [x] Verbal  [] Demo  [x] Written  Access Code: [email or web address]  URL: [email or web address]  Date: 07/02/2024  Prepared by: Sarah Irby    Exercises  - Supine Posterior Pelvic Tilt  - 1 x daily - 7 x weekly - 2 sets - 10 reps - 5sec hold  - Supine March  - 1 x daily - 7 x weekly - 2 sets - 10 reps  - Bent Knee Fallouts with Alternating Legs  - 1 x daily - 7 x weekly - 2 sets - 10 reps  - Supine Hip Adduction Isometric with Ball  - 1 x daily - 7 x weekly - 2 sets - 10 reps - 5sec hold  - Supine Hamstring Stretch with Strap  - 1 x daily - 7 x weekly - 1 sets - 3 reps - 30sec hold  - Supine Bridge with Resistance Band  - 1 x daily - 7 x weekly - 2 sets - 10 reps  - Clamshell with Resistance  - 1 x daily - 7 x weekly - 2 sets - 10 reps  - Sidelying Hip Abduction with Resistance at Thighs  - 1 x daily - 7 x weekly - 2 sets - 10 reps  -

## 2024-07-08 ENCOUNTER — HOSPITAL ENCOUNTER (OUTPATIENT)
Dept: PHYSICAL THERAPY | Facility: CLINIC | Age: 19
Setting detail: THERAPIES SERIES
Discharge: HOME | End: 2024-07-08
Attending: ORTHOPAEDIC SURGERY
Payer: MEDICAID

## 2024-07-08 PROCEDURE — 97110 THERAPEUTIC EXERCISES: CPT

## 2024-07-08 NOTE — FLOWSHEET NOTE
[] [facility]  Outpatient Rehabilitation &  Therapy  [address]  [phone]  [phone] [] [facility]  Outpatient Rehabilitation &  Therapy  [address]  P: [phone]  F: [phone] [x] [facility]  Outpatient Rehabilitation &  Therapy  [address]  P: [phone]  F: [phone] [] [facility]  Outpatient Rehabilitation &  Therapy  [address]  [phone]  [phone] [] [facility]  Outpatient Rehabilitation &  Therapy  [address]   P: [phone]  F: [phone]  [] [facility]  Outpatient Rehabilitation &  Therapy  [address]  P: [phone]  F: [phone] [] [facility]  Outpatient Rehabilitation &  Therapy  [address]  P: [phone]  F: [phone] [] [facility]  Outpatient Rehabilitation &  Therapy  [address]  P: [phone]  F: [phone] [] [facility]  Outpatient Rehabilitation &  Therapy  [address]  P: [phone]  F: [phone]  [] [facility] Outpatient Rehabilitation &  Therapy  [address]  P: [phone]  F: [phone]     Physical Therapy Daily Treatment Note    Date:  2024  Patient Name:  Yarelis Farr  \"Jodi\"  :  2005  MRN: [id number]  Physician: Frankie Nickerson MD   Insurance: Count includes the [facility] MEDICAID ( remains, Auth After 30vs)   Medical Diagnosis:   S32.10XD (ICD-10-CM) - Closed fracture of sacrum with routine healing, unspecified portion of sacrum, subsequent encounter   M54.50 (ICD-10-CM) - Acute midline low back pain without sciatica   Rehab Codes: M54.59, M25.651, M25.652, R29.3, M62.81  Onset Date: 3/15/24               Next 's appt.: 8/15/24  Visit# / total visits: 3/10   Cancels/No Shows: 0    Subjective:    Pain:  [x] Yes  []

## 2024-07-10 ENCOUNTER — HOSPITAL ENCOUNTER (OUTPATIENT)
Dept: PHYSICAL THERAPY | Facility: CLINIC | Age: 19
Setting detail: THERAPIES SERIES
Discharge: HOME | End: 2024-07-10
Attending: ORTHOPAEDIC SURGERY
Payer: MEDICAID

## 2024-07-10 PROCEDURE — 97110 THERAPEUTIC EXERCISES: CPT

## 2024-07-10 NOTE — FLOWSHEET NOTE
[] [facility]  Outpatient Rehabilitation &  Therapy  [address]  [phone]  [phone] [] [facility]  Outpatient Rehabilitation &  Therapy  [address]  P: [phone]  F: [phone] [x] [facility]  Outpatient Rehabilitation &  Therapy  [address]  P: [phone]  F: [phone] [] [place]  Outpatient Rehabilitation &  Therapy  [address]  [phone]  [phone] [] [facility]  Outpatient Rehabilitation &  Therapy  [address]   P: [phone]  F: [phone]  [] [facility]  Outpatient Rehabilitation &  Therapy  [address]  P: [phone]  F: [phone] [] [facility]  Outpatient Rehabilitation &  Therapy  [address]  P: [phone]  F: [phone] [] [facility]  Outpatient Rehabilitation &  Therapy  [address]  P: [phone]  F: [phone] [] [facility]  Outpatient Rehabilitation &  Therapy  [address]  P: [phone]  F: [phone]  [] [facility] Outpatient Rehabilitation &  Therapy  [address]  P: [phone]  F: [phone]     Physical Therapy Daily Treatment Note    Date:  7/10/2024  Patient Name:  Yarelis Farr  \"Jodi\"  :  2005  MRN: [id number]  Physician: Frankie Nickerson MD   Insurance: [facility] MEDICAID ( remains, Auth After 30vs)   Medical Diagnosis:   S32.10XD (ICD-10-CM) - Closed fracture of sacrum with routine healing, unspecified portion of sacrum, subsequent encounter   M54.50 (ICD-10-CM) - Acute midline low back pain without sciatica   Rehab Codes: M54.59, M25.651, M25.652, R29.3, M62.81  Onset Date: 3/15/24               Next 's appt.: 8/15/24  Visit# / total visits: 4/10   Cancels/No Shows: 0    Subjective:    Pain:  [x] Yes  []

## 2024-07-15 ENCOUNTER — HOSPITAL ENCOUNTER (OUTPATIENT)
Dept: PHYSICAL THERAPY | Facility: CLINIC | Age: 19
Setting detail: THERAPIES SERIES
Discharge: HOME | End: 2024-07-15
Attending: ORTHOPAEDIC SURGERY
Payer: MEDICAID

## 2024-07-15 NOTE — CARE COORDINATION
[] [facility]  Outpatient Rehabilitation &  Therapy  [address]  [phone]  [phone] [] [facility]  Outpatient Rehabilitation &  Therapy  [address]  P: [phone]  F: [phone] [x] [facility]  Outpatient Rehabilitation &  Therapy  [address]  P: [phone]  F: [phone] [] [facility]  Outpatient Rehabilitation &  Therapy  [address]  [phone]  [phone] [] [facility]  Outpatient Rehabilitation &  Therapy  [address]   P: [phone]  F: [phone]  [] [facility]  Outpatient Rehabilitation &  Therapy  [address]  P: [phone]  F: [phone] [] [facility]  Outpatient Rehabilitation &  Therapy  [address]  P: [phone]  F: [phone] [] [facility]  Outpatient Rehabilitation &  Therapy  [address]  P: [phone]  F: [phone] [] [facility]  Outpatient Rehabilitation &  Therapy  [address]  P: [phone]  F: [phone]  [] [facility] Outpatient Rehabilitation &  Therapy  [address]  P: [phone]  F: [phone]     THERAPY RESPONSIBILITY OF CARE TRANSFER FORM       PATIENT NAME: Yarelis Farr  MRN: [id number]   : 2005      TRANSFERRING FACILITY:    [x] [place]   [] [facility]   [] [facility]   [] [facility]   [] [facility] [] [place]   [] [facility]  [] [facility]  [] [facility]   [] [place]  [] [place]   [] Other:          ACCEPTING FACILITY  [x] [place]   [] [facility]   [] [facility]   [] [facility]   [] [facility] [] [place]   [] [facility]  [] [facility]  []  [facility]   [] [place]  [] [place]   [] Other:         REASON FOR TRANSFER: Primary PT leaving

## 2024-07-15 NOTE — FLOWSHEET NOTE
[] [facility]  Outpatient Rehabilitation &  Therapy  [address]  [phone]  [phone] [] [facility]  Outpatient Rehabilitation &  Therapy  [address]  P: [phone]  F: [phone] [x] [facility]  Outpatient Rehabilitation &  Therapy  [address]  P: [phone]  F: [phone] [] [facility]  Outpatient Rehabilitation &  Therapy  [address]  [phone]  [phone] [] [facility]  Outpatient Rehabilitation &  Therapy  [address]   P: [phone]  F: [phone]  [] [facility]  Outpatient Rehabilitation &  Therapy  [address]  P: [phone]  F: [phone] [] [facility]  Outpatient Rehabilitation &  Therapy  [address]  P: [phone]  F: [phone] [] [facility]  Outpatient Rehabilitation &  Therapy  [address]  P: [phone]  F: [phone] [] [facility]  Outpatient Rehabilitation &  Therapy  [address]  P: [phone]  F: [phone]  [] [facility] Outpatient Rehabilitation &  Therapy  [address]  P: [phone]  F: [phone]     Therapy Cancel/No Show note    Date: 7/15/2024  Patient: Yarelis Farr  : 2005  MRN: [id number]    Cancels/No Shows to date: 0    For today's appointment patient:    [x]  Cancelled    [] Rescheduled appointment    [] No-show     Reason given by patient:    []  Patient ill    []  Conflicting appointment    [] No transportation      [] Conflict with work    [] No reason given    [] Weather related    [] COVID-19    [x] Other: overslept.    Comments:        [x] Next appointment was confirmed    Electronically signed by: Emma Godoy PT

## 2024-07-17 ENCOUNTER — APPOINTMENT (OUTPATIENT)
Dept: PHYSICAL THERAPY | Facility: CLINIC | Age: 19
End: 2024-07-17
Attending: ORTHOPAEDIC SURGERY
Payer: MEDICAID

## 2024-07-23 ENCOUNTER — HOSPITAL ENCOUNTER (OUTPATIENT)
Dept: PHYSICAL THERAPY | Facility: CLINIC | Age: 19
Setting detail: THERAPIES SERIES
Discharge: HOME | End: 2024-07-23
Attending: ORTHOPAEDIC SURGERY
Payer: MEDICAID

## 2024-07-23 PROCEDURE — 97110 THERAPEUTIC EXERCISES: CPT

## 2024-07-23 NOTE — FLOWSHEET NOTE
[] [facility]  Outpatient Rehabilitation &  Therapy  [address]  [phone]  [phone] [] [facility]  Outpatient Rehabilitation &  Therapy  [address]  P: [phone]  F: [phone] [x] [facility]  Outpatient Rehabilitation &  Therapy  [address]  P: [phone]  F: [phone] [] [facility]  Outpatient Rehabilitation &  Therapy  [address]  [phone]  [phone] [] [facility]  Outpatient Rehabilitation &  Therapy  [address]   P: [phone]  F: [phone]  [] [facility]  Outpatient Rehabilitation &  Therapy  [address]  P: [phone]  F: [phone] [] [facility]  Outpatient Rehabilitation &  Therapy  [address]  P: [phone]  F: [phone] [] [facility]  Outpatient Rehabilitation &  Therapy  [address]  P: [phone]  F: [phone] [] [facility]  Outpatient Rehabilitation &  Therapy  [address]  P: [phone]  F: [phone]  [] [facility] Outpatient Rehabilitation &  Therapy  [address]  P: [phone]  F: [phone]     Physical Therapy Daily Treatment Note    Date:  2024  Patient Name:  Yarelis Farr  \"Jodi\"  :  2005  MRN: [id number]  Physician: Frankie Nickerson MD   Insurance: [facility] MEDICAID ( remains, Auth After 30vs)   Medical Diagnosis:   S32.10XD (ICD-10-CM) - Closed fracture of sacrum with routine healing, unspecified portion of sacrum, subsequent encounter   M54.50 (ICD-10-CM) - Acute midline low back pain without sciatica   Rehab Codes: M54.59, M25.651, M25.652, R29.3, M62.81  Onset Date: 3/15/24               Next 's appt.: 8/15/24  Visit# / total visits: 5/10   Cancels/No Shows: 0    Subjective:    Pain:  [] Yes  [x]

## 2024-07-25 ENCOUNTER — HOSPITAL ENCOUNTER (OUTPATIENT)
Dept: PHYSICAL THERAPY | Facility: CLINIC | Age: 19
Setting detail: THERAPIES SERIES
Discharge: HOME | End: 2024-07-25
Attending: ORTHOPAEDIC SURGERY
Payer: MEDICAID

## 2024-07-25 PROCEDURE — 97110 THERAPEUTIC EXERCISES: CPT

## 2024-07-25 NOTE — FLOWSHEET NOTE
[] [facility]  Outpatient Rehabilitation &  Therapy  [address]  [phone]  [phone] [] [facility]  Outpatient Rehabilitation &  Therapy  [address]  P: [phone]  F: [phone] [x] [facility]  Outpatient Rehabilitation &  Therapy  [address]  P: [phone]  F: [phone] [] [facility]  Outpatient Rehabilitation &  Therapy  [address]  [phone]  [phone] [] [facility]  Outpatient Rehabilitation &  Therapy  [address]   P: [phone]  F: [phone]  [] [facility]  Outpatient Rehabilitation &  Therapy  [address]  P: [phone]  F: [phone] [] [facility]  Outpatient Rehabilitation &  Therapy  [address]  P: [phone]  F: [phone] [] [facility]  Outpatient Rehabilitation &  Therapy  [address]  P: [phone]  F: [phone] [] [facility]  Outpatient Rehabilitation &  Therapy  [address]  P: [phone]  F: [phone]  [] [facility] Outpatient Rehabilitation &  Therapy  [address]  P: [phone]  F: [phone]     Physical Therapy Daily Treatment Note    Date:  2024  Patient Name:  Yarelis Farr  \"Jodi\"  :  2005  MRN: [id number]  Physician: Frankie Nickerson MD   Insurance: [facility] MEDICAID ( remains, Auth After 30vs)   Medical Diagnosis:   S32.10XD (ICD-10-CM) - Closed fracture of sacrum with routine healing, unspecified portion of sacrum, subsequent encounter   M54.50 (ICD-10-CM) - Acute midline low back pain without sciatica   Rehab Codes: M54.59, M25.651, M25.652, R29.3, M62.81  Onset Date: 3/15/24               Next 's appt.: 8/15/24  Visit# / total visits: 6/10   Cancels/No Shows: 0    Subjective:    Pain:  [] Yes  [x]

## 2024-07-31 ENCOUNTER — HOSPITAL ENCOUNTER (OUTPATIENT)
Dept: PHYSICAL THERAPY | Facility: CLINIC | Age: 19
Setting detail: THERAPIES SERIES
Discharge: HOME | End: 2024-07-31
Attending: ORTHOPAEDIC SURGERY
Payer: MEDICAID

## 2024-07-31 NOTE — FLOWSHEET NOTE
[] [facility]  Outpatient Rehabilitation &  Therapy  [address]  [phone]  [phone] [] [facility]  Outpatient Rehabilitation &  Therapy  [address]  P: [phone]  F: [phone] [x] [facility]  Outpatient Rehabilitation &  Therapy  [address]  P: [phone]  F: [phone] [] [facility]  Outpatient Rehabilitation &  Therapy  [address]  [phone]  [phone] [] [facility]  Outpatient Rehabilitation &  Therapy  [address]   P: [phone]  F: [phone]  [] [facility]  Outpatient Rehabilitation &  Therapy  [address]  P: [phone]  F: [phone] [] [facility]  Outpatient Rehabilitation &  Therapy  [address]  P: [phone]  F: [phone] [] [facility]  Outpatient Rehabilitation &  Therapy  [address]  P: [phone]  F: [phone] [] [facility]  Outpatient Rehabilitation &  Therapy  [address]  P: [phone]  F: [phone]  [] [facility] Outpatient Rehabilitation &  Therapy  [address]  P: [phone]  F: [phone]     Therapy Cancel/No Show note    Date: 2024  Patient: Yarelis Farr  : 2005  MRN: [id number]    Cancels/No Shows to date: 20    For today's appointment patient:    [x]  Cancelled    [] Rescheduled appointment    [] No-show     Reason given by patient:    []  Patient ill    []  Conflicting appointment    [] No transportation      [x] Conflict with work    [] No reason given    [] Weather related    [] COVID-19    [] Other:     Comments:        [x] Next appointment was confirmed    Electronically signed by: Sarah Alatorre, PTA

## 2024-08-02 ENCOUNTER — APPOINTMENT (OUTPATIENT)
Dept: PHYSICAL THERAPY | Facility: CLINIC | Age: 19
End: 2024-08-02
Attending: ORTHOPAEDIC SURGERY
Payer: MEDICAID

## 2024-08-06 ENCOUNTER — HOSPITAL ENCOUNTER (OUTPATIENT)
Dept: PHYSICAL THERAPY | Facility: CLINIC | Age: 19
Setting detail: THERAPIES SERIES
Discharge: HOME | End: 2024-08-06
Attending: ORTHOPAEDIC SURGERY
Payer: MEDICAID

## 2024-08-06 PROCEDURE — 97110 THERAPEUTIC EXERCISES: CPT

## 2024-08-06 NOTE — FLOWSHEET NOTE
[] [facility]  Outpatient Rehabilitation &  Therapy  [address]  [phone]  [phone] [] [facility]  Outpatient Rehabilitation &  Therapy  [address]  P: [phone]  F: [phone] [x] [facility]  Outpatient Rehabilitation &  Therapy  [address]  P: [phone]  F: [phone] [] [facility]  Outpatient Rehabilitation &  Therapy  [address]  [phone]  [phone] [] [facility]  Outpatient Rehabilitation &  Therapy  [address]   P: [phone]  F: [phone]  [] [facility]  Outpatient Rehabilitation &  Therapy  [address]  P: [phone]  F: [phone] [] [facility]  Outpatient Rehabilitation &  Therapy  [address]  P: [phone]  F: [phone] [] [facility]  Outpatient Rehabilitation &  Therapy  [address]  P: [phone]  F: [phone] [] [facility]  Outpatient Rehabilitation &  Therapy  [address]  P: [phone]  F: [phone]  [] [facility] Outpatient Rehabilitation &  Therapy  [address]  P: [phone]  F: [phone]     Physical Therapy Daily Treatment Note    Date:  2024  Patient Name:  Yarelis Farr  \"Jodi\"  :  2005  MRN: [id number]  Physician: Frankie Nickerson MD   Insurance: [facility] MEDICAID ( remains, Auth After 30vs)   Medical Diagnosis:   S32.10XD (ICD-10-CM) - Closed fracture of sacrum with routine healing, unspecified portion of sacrum, subsequent encounter   M54.50 (ICD-10-CM) - Acute midline low back pain without sciatica   Rehab Codes: M54.59, M25.651, M25.652, R29.3, M62.81  Onset Date: 3/15/24               Next 's appt.: 8/15/24  Visit# / total visits: 7/10   Cancels/No Shows: 0    Subjective:    Pain:  [] Yes  [x]

## 2024-08-09 ENCOUNTER — APPOINTMENT (OUTPATIENT)
Dept: PHYSICAL THERAPY | Facility: CLINIC | Age: 19
End: 2024-08-09
Attending: ORTHOPAEDIC SURGERY
Payer: MEDICAID

## 2024-08-12 ENCOUNTER — HOSPITAL ENCOUNTER (OUTPATIENT)
Dept: PHYSICAL THERAPY | Facility: CLINIC | Age: 19
Setting detail: THERAPIES SERIES
Discharge: HOME | End: 2024-08-12
Attending: ORTHOPAEDIC SURGERY
Payer: MEDICAID

## 2024-08-12 NOTE — FLOWSHEET NOTE
[] [facility]  Outpatient Rehabilitation &  Therapy  [address]  [phone]  [phone] [] [facility]  Outpatient Rehabilitation &  Therapy  [address]  P: [phone]  F: [phone] [x] [facility]  Outpatient Rehabilitation &  Therapy  [address]  P: [phone]  F: [phone] [] [facility]  Outpatient Rehabilitation &  Therapy  [address]  [phone]  [phone] [] [facility]  Outpatient Rehabilitation &  Therapy  [address]   P: [phone]  F: [phone]  [] [facility]  Outpatient Rehabilitation &  Therapy  [address]  P: [phone]  F: [phone] [] [facility]  Outpatient Rehabilitation &  Therapy  [address]  P: [phone]  F: [phone] [] [facility]  Outpatient Rehabilitation &  Therapy  [address]  P: [phone]  F: [phone] [] [facility]  Outpatient Rehabilitation &  Therapy  [address]  P: [phone]  F: [phone]  [] [facility] Outpatient Rehabilitation &  Therapy  [address]  P: [phone]  F: [phone]     Therapy Cancel/No Show note    Date: 2024  Patient: Yarelis Farr  : 2005  MRN: [id number]    Cancels/No Shows to date: 30    For today's appointment patient:    [x]  Cancelled    [] Rescheduled appointment    [] No-show     Reason given by patient:    []  Patient ill    []  Conflicting appointment    [] No transportation      [] Conflict with work    [x] No reason given    [] Weather related    [] COVID-19    [] Other:     Comments:        [] Next appointment was confirmed    Electronically signed by: Sarah Alatorre, PTA

## 2024-08-20 ENCOUNTER — HOSPITAL ENCOUNTER (OUTPATIENT)
Dept: PHYSICAL THERAPY | Facility: CLINIC | Age: 19
Setting detail: THERAPIES SERIES
Discharge: HOME | End: 2024-08-20
Attending: ORTHOPAEDIC SURGERY
Payer: MEDICAID

## 2024-08-20 PROCEDURE — 97110 THERAPEUTIC EXERCISES: CPT

## 2024-08-20 PROCEDURE — 97530 THERAPEUTIC ACTIVITIES: CPT

## 2024-08-20 NOTE — PROGRESS NOTES
lumbar AROM within all planes, however minimal pain with extension (ONGOING)   Patient to be independent with home exercise program as demonstrated by performance with correct form without cues. (MET)   Pt will improve Modified Oswestry score from 32% impaired to less than 20% impaired to demo improved functional mobility to participate in daily functional activities Patient reports KIMBERLY as 26% impaired (ONGOING)      Patient goals: \"to feel a little bit better\"    Pt. Education:  [] Yes  [] No  [x] Reviewed Prior HEP/Ed  Method of Education: [] Verbal  [] Demo  [] Written  Access Code: [email or web address]  URL: [email or web address]  Date: 08/06/2024  Prepared by: Sarah Irby    Exercises  - Supine Posterior Pelvic Tilt  - 1 x daily - 7 x weekly - 2 sets - 10 reps - 5sec hold  - Supine March  - 1 x daily - 7 x weekly - 2 sets - 10 reps  - Bent Knee Fallouts with Alternating Legs  - 1 x daily - 7 x weekly - 2 sets - 10 reps  - Supine Hip Adduction Isometric with Ball  - 1 x daily - 7 x weekly - 2 sets - 10 reps - 5sec hold  - Supine Hamstring Stretch with Strap  - 1 x daily - 7 x weekly - 1 sets - 3 reps - 30sec hold  - Supine Bridge with Resistance Band  - 1 x daily - 7 x weekly - 2 sets - 10 reps  - Clamshell with Resistance  - 1 x daily - 7 x weekly - 2 sets - 10 reps  - Sidelying Hip Abduction with Resistance at Thighs  - 1 x daily - 7 x weekly - 2 sets - 10 reps  - Sidelying Reverse Clamshell with Resistance  - 1 x daily - 7 x weekly - 2 sets - 10 reps  - Side Stepping with Resistance at Ankles  - 1 x daily - 7 x weekly - 2 sets - 10 reps    Comprehension of Education:  [x] Verbalizes understanding.  [] Demonstrates understanding.  [] Needs review.  [] Demonstrates/verbalizes HEP/Ed previously given.     Plan: [x] Plan to cont with current poc for an additional 2x/week for 6 visits in order to cont to progress glute med strength and increase walking tolerance/ stair negotiation       Time In: 3:30 pm

## 2024-08-22 ENCOUNTER — OFFICE VISIT (OUTPATIENT)
Dept: ORTHOPEDIC SURGERY | Age: 19
End: 2024-08-22
Payer: MEDICAID

## 2024-08-22 ENCOUNTER — TELEPHONE (OUTPATIENT)
Dept: PAIN MANAGEMENT | Age: 19
End: 2024-08-22

## 2024-08-22 VITALS — RESPIRATION RATE: 14 BRPM | WEIGHT: 110.01 LBS | HEIGHT: 65 IN | BODY MASS INDEX: 18.33 KG/M2

## 2024-08-22 DIAGNOSIS — M54.50 CHRONIC MIDLINE LOW BACK PAIN WITHOUT SCIATICA: Primary | ICD-10-CM

## 2024-08-22 DIAGNOSIS — S32.10XD CLOSED FRACTURE OF SACRUM WITH ROUTINE HEALING, UNSPECIFIED PORTION OF SACRUM, SUBSEQUENT ENCOUNTER: ICD-10-CM

## 2024-08-22 DIAGNOSIS — G89.29 CHRONIC MIDLINE LOW BACK PAIN WITHOUT SCIATICA: Primary | ICD-10-CM

## 2024-08-22 PROCEDURE — 99213 OFFICE O/P EST LOW 20 MIN: CPT | Performed by: ORTHOPAEDIC SURGERY

## 2024-08-22 NOTE — PROGRESS NOTES
Patient ID: Yarelis Farr is a 18 y.o. female    Chief Compliant:  No chief complaint on file.       Diagnostic imaging:        Assessment and Plan:  1. Chronic midline low back pain without sciatica    2. Closed fracture of sacrum with routine healing, unspecified portion of sacrum, subsequent encounter        Low back and coccyx pain.  Distal sacral pain/coccyx pain has now largely resolved but is complaining more of her low back reports physical therapy is helping    Continue with PT    Refer to pain management for lumbar epidural steroid injections    Follow up 3 months    Consider flexion extension x-rays on follow-up    HPI:  This is a 18 y.o. female who presents to the clinic today for low back and coccyx pain.     Patient completed PT which provides great relief    Review of Systems   All other systems reviewed and are negative.      Past History:    Current Outpatient Medications:     cyclobenzaprine (FLEXERIL) 10 MG tablet, Take 1 tablet by mouth 3 times daily as needed for Muscle spasms, Disp: 90 tablet, Rfl: 1    cyclobenzaprine (FLEXERIL) 10 MG tablet, Take 1 tablet by mouth 3 times daily as needed for Muscle spasms, Disp: 90 tablet, Rfl: 1    QUEtiapine (SEROQUEL) 100 MG tablet, , Disp: , Rfl:     buPROPion (WELLBUTRIN XL) 150 MG extended release tablet, , Disp: , Rfl:     acetaminophen (TYLENOL) 500 MG tablet, Take 2 tablets by mouth every 6 hours as needed for Pain or Fever, Disp: 56 tablet, Rfl: 0    ibuprofen (IBU) 600 MG tablet, Take 1 tablet by mouth every 6 hours as needed for Pain, Disp: 21 tablet, Rfl: 0    ibuprofen (ADVIL;MOTRIN) 800 MG tablet, Take 1 tablet by mouth every 8 hours as needed for Pain, Disp: 30 tablet, Rfl: 0    senna (SENOKOT) 8.6 MG tablet, Take 1 tablet by mouth daily (Patient not taking: Reported on 10/3/2023), Disp: , Rfl:     Fexofenadine HCl (ALLEGRA ALLERGY PO), Take by mouth, Disp: , Rfl:     fluticasone (FLONASE) 50 MCG/ACT nasal spray, 2 sprays by Each Nostril

## 2024-08-22 NOTE — TELEPHONE ENCOUNTER
New patient with referral on file is requesting a return call to schedule a new patient appointment with Dr Taylor.    Thank you.

## 2024-08-27 ENCOUNTER — HOSPITAL ENCOUNTER (OUTPATIENT)
Dept: PHYSICAL THERAPY | Facility: CLINIC | Age: 19
Setting detail: THERAPIES SERIES
Discharge: HOME | End: 2024-08-27
Attending: ORTHOPAEDIC SURGERY
Payer: MEDICAID

## 2024-08-27 PROCEDURE — 97110 THERAPEUTIC EXERCISES: CPT

## 2024-08-27 NOTE — FLOWSHEET NOTE
[] [facility]  Outpatient Rehabilitation &  Therapy  [address]  [phone]  [phone] [] [facility]  Outpatient Rehabilitation &  Therapy  [address]  P: [phone]  F: [phone] [x] [facility]  Outpatient Rehabilitation &  Therapy  [address]  P: [phone]  F: [phone] [] [facility]  Outpatient Rehabilitation &  Therapy  [address]  [phone]  [phone] [] [facility]  Outpatient Rehabilitation &  Therapy  [address]   P: [phone]  F: [phone]  [] [facility]  Outpatient Rehabilitation &  Therapy  [address]  P: [phone]  F: [phone] [] [facility]  Outpatient Rehabilitation &  Therapy  [address]  P: [phone]  F: [phone] [] [facility]  Outpatient Rehabilitation &  Therapy  [address]  P: [phone]  F: [phone] [] [facility]  Outpatient Rehabilitation &  Therapy  [address]  P: [phone]  F: [phone]  [] [facility] Outpatient Rehabilitation &  Therapy  [address]  P: [phone]  F: [phone]     Physical Therapy Daily Treatment Note    Date:  2024  Patient Name:  Yarelis Farr  \"Jodi\"  :  2005  MRN: [id number]  Physician: Frankie Nickerson MD   Insurance: [facility] MEDICAID ( remains, Auth After 30vs)   Medical Diagnosis:   S32.10XD (ICD-10-CM) - Closed fracture of sacrum with routine healing, unspecified portion of sacrum, subsequent encounter   M54.50 (ICD-10-CM) - Acute midline low back pain without sciatica   Rehab Codes: M54.59, M25.651, M25.652, R29.3, M62.81  Onset Date: 3/15/24               Next 's appt.: 8/15/24  Visit# / total visits:    Cancels/No Shows: 0    Subjective:    Pain:  [x] Yes  []

## 2024-09-05 ENCOUNTER — HOSPITAL ENCOUNTER (EMERGENCY)
Facility: CLINIC | Age: 19
Discharge: HOME | End: 2024-09-05
Attending: EMERGENCY MEDICINE

## 2024-09-05 VITALS
WEIGHT: 110 LBS | RESPIRATION RATE: 15 BRPM | DIASTOLIC BLOOD PRESSURE: 82 MMHG | SYSTOLIC BLOOD PRESSURE: 139 MMHG | HEART RATE: 73 BPM | HEIGHT: 65 IN | TEMPERATURE: 98.7 F | BODY MASS INDEX: 18.33 KG/M2 | OXYGEN SATURATION: 100 %

## 2024-09-05 DIAGNOSIS — R10.9 ABDOMINAL CRAMPING: ICD-10-CM

## 2024-09-05 DIAGNOSIS — N89.8 VAGINAL DISCHARGE: Primary | ICD-10-CM

## 2024-09-05 LAB
BILIRUB UR QL STRIP: NEGATIVE
C TRACH DNA SPEC QL PROBE+SIG AMP: NORMAL
CANDIDA SPECIES: NEGATIVE
CLARITY UR: CLEAR
COLOR UR: YELLOW
COMMENT: NORMAL
GARDNERELLA VAGINALIS: POSITIVE
GLUCOSE UR STRIP-MCNC: NEGATIVE MG/DL
HCG UR QL: NEGATIVE
HGB UR QL STRIP.AUTO: NEGATIVE
KETONES UR STRIP-MCNC: NEGATIVE MG/DL
LEUKOCYTE ESTERASE UR QL STRIP: NEGATIVE
N GONORRHOEA DNA SPEC QL PROBE+SIG AMP: NORMAL
NITRITE UR QL STRIP: NEGATIVE
PH UR STRIP: 6 [PH] (ref 5–8)
PROT UR STRIP-MCNC: NEGATIVE MG/DL
SOURCE: ABNORMAL
SP GR UR STRIP: 1.01 (ref 1–1.03)
SPECIMEN DESCRIPTION: NORMAL
TRICHOMONAS: NEGATIVE
UROBILINOGEN UR STRIP-ACNC: NORMAL EU/DL (ref 0–1)

## 2024-09-05 PROCEDURE — 87591 N.GONORRHOEAE DNA AMP PROB: CPT

## 2024-09-05 PROCEDURE — 87510 GARDNER VAG DNA DIR PROBE: CPT

## 2024-09-05 PROCEDURE — 87660 TRICHOMONAS VAGIN DIR PROBE: CPT

## 2024-09-05 PROCEDURE — 99283 EMERGENCY DEPT VISIT LOW MDM: CPT

## 2024-09-05 PROCEDURE — 81025 URINE PREGNANCY TEST: CPT

## 2024-09-05 PROCEDURE — 81003 URINALYSIS AUTO W/O SCOPE: CPT

## 2024-09-05 PROCEDURE — 87480 CANDIDA DNA DIR PROBE: CPT

## 2024-09-05 PROCEDURE — 87491 CHLMYD TRACH DNA AMP PROBE: CPT

## 2024-09-05 ASSESSMENT — PAIN - FUNCTIONAL ASSESSMENT: PAIN_FUNCTIONAL_ASSESSMENT: 0-10

## 2024-09-05 ASSESSMENT — PAIN DESCRIPTION - LOCATION: LOCATION: ABDOMEN

## 2024-09-05 ASSESSMENT — PAIN DESCRIPTION - ORIENTATION: ORIENTATION: MID;LOWER

## 2024-09-05 ASSESSMENT — PAIN SCALES - GENERAL: PAINLEVEL_OUTOF10: 6

## 2024-09-05 ASSESSMENT — PAIN DESCRIPTION - DESCRIPTORS: DESCRIPTORS: CRAMPING

## 2024-09-05 NOTE — ED NOTES
Pt presents to ED ambulatory a&o x4. Pt states about 1 month ago she had cultures and swabs sent for urination and abd issues. Pt reports that since the she has been having vaginal discharge and suprapubic cramping. States she received a call from her PCP today who told her her BV came back positive but did not call in script because she wants to see her in the office first.

## 2024-09-05 NOTE — DISCHARGE INSTRUCTIONS
Vaginal cultures were sent.  If they return with a problem you will receive a phone call and proper medications will be called in    Please follow-up with Dr. Solorio for reevaluation of symptoms on Monday.     Refrain from sexual contact until you receive your cultures to prevent spreading infection     Take Tylenol or Motrin as directed as needed for any discomfort    If any symptoms worsen or any new or concerning symptoms arise please return immediately to the emergency department  
 /

## 2024-09-07 NOTE — ED PROVIDER NOTES
[facility]  eMERGENCY dEPARTMENT eNCOUnter   Independent Attestation     Pt Name: Yarelis Farr  MRN: [id number]  Birthdate 2005  Date of evaluation: 9/5/24       Yarelis Farr is a 19 y.o. female who presents with Vaginal Discharge and Abdominal Cramping        Based on the medical record, the care appears appropriate. I was personally available for consultation in the Emergency Department.    Kerry Duggan DO  Attending Emergency  Physician               Kerry Duggan DO  09/05/24 1956    
seen by the attending physician and they agreed with the assessment and plan.       DIAGNOSTIC RESULTS     EKG: All EKG's are interpreted by the Emergency Department Physician who either signs or Co-signs this chart in the absence of a cardiologist.        RADIOLOGY:   Non-plain film images such as CT, Ultrasound and MRI are read by the radiologist. Plain radiographic images are visualized and preliminarily interpreted by the emergency physician with the below findings:      Interpretation per the Radiologist below, if available at the time of this note:    No orders to display         ED BEDSIDE ULTRASOUND:   Performed by ED Physician - none    LABS:  Labs Reviewed   VAGINITIS DNA PROBE - Abnormal; Notable for the following components:       Result Value    GARDNERELLA VAGINALIS POSITIVE (*)     All other components within normal limits   C.TRACHOMATIS N.GONORRHOEAE DNA   URINALYSIS WITH REFLEX TO CULTURE   PREGNANCY, URINE       All other labs were within normal range or not returned as of this dictation.    EMERGENCY DEPARTMENT COURSE and DIFFERENTIAL DIAGNOSIS/MDM:   Vitals:    Vitals:    09/05/24 1912   BP: 139/82   Pulse: 73   Resp: 15   Temp: 98.7 °F (37.1 °C)   TempSrc: Oral   SpO2: 100%   Weight: 49.9 kg (110 lb)   Height: 1.651 m (5' 5\")             REASSESSMENT          PROCEDURES:  Unless otherwise noted below, none     Procedures    FINAL IMPRESSION      1. Vaginal discharge    2. Abdominal cramping          DISPOSITION/PLAN   DISPOSITION Decision To Discharge 09/05/2024 07:54:35 PM  Condition at Disposition: Stable      PATIENT REFERRED TO:  Eduardo Solorio MD  [address] [place] [address], [address] [id number] [phone]    Schedule an appointment as soon as possible for a visit in 2 days        DISCHARGE MEDICATIONS:  Discharge Medication List as of 9/5/2024  7:59 PM        Controlled Substances Monitoring:          No data to display                (Please note that portions of this note were

## 2024-09-09 ENCOUNTER — INITIAL CONSULT (OUTPATIENT)
Dept: PAIN MANAGEMENT | Age: 19
End: 2024-09-09

## 2024-09-09 VITALS — BODY MASS INDEX: 18.33 KG/M2 | WEIGHT: 110 LBS | HEIGHT: 65 IN

## 2024-09-09 DIAGNOSIS — M51.36 DEGENERATION OF LUMBAR INTERVERTEBRAL DISC: ICD-10-CM

## 2024-09-09 DIAGNOSIS — M47.817 LUMBOSACRAL SPONDYLOSIS WITHOUT MYELOPATHY: Primary | ICD-10-CM

## 2024-09-09 LAB
C TRACH DNA SPEC QL PROBE+SIG AMP: NEGATIVE
N GONORRHOEA DNA SPEC QL PROBE+SIG AMP: NEGATIVE
SPECIMEN DESCRIPTION: NORMAL

## 2024-09-09 PROCEDURE — 99204 OFFICE O/P NEW MOD 45 MIN: CPT | Performed by: PAIN MEDICINE

## 2024-09-09 ASSESSMENT — ENCOUNTER SYMPTOMS
BOWEL INCONTINENCE: 0
BACK PAIN: 1

## 2024-09-25 ENCOUNTER — TELEPHONE (OUTPATIENT)
Dept: PAIN MANAGEMENT | Age: 19
End: 2024-09-25

## 2024-10-08 ENCOUNTER — CLINICAL DOCUMENTATION (OUTPATIENT)
Dept: PHYSICAL THERAPY | Facility: CLINIC | Age: 19
End: 2024-10-08

## 2024-10-08 NOTE — DISCHARGE SUMMARY
[] [facility]  Outpatient Rehabilitation &  Therapy  [address]  [phone]  [phone] [] [facility]  Outpatient Rehabilitation &  Therapy  [address]  P: [phone]  F: [phone] [x] [facility]  Outpatient Rehabilitation &  Therapy  [address]  P: [phone]  F: [phone] [] [facility]  Outpatient Rehabilitation &  Therapy  [address]  [phone]  [phone] [] [facility]  Outpatient Rehabilitation &  Therapy  [address]   P: [phone]  F: [phone]  [] [facility]  Outpatient Rehabilitation &  Therapy  [address]  P: [phone]  F: [phone] [] [facility]  Outpatient Rehabilitation &  Therapy  [address]  P: [phone]  F: [phone] [] [facility]  Outpatient Rehabilitation &  Therapy  [address]  P: [phone]  F: [phone] [] [facility]  Outpatient Rehabilitation &  Therapy  [address]  P: [phone]  F: [phone]  [] [facility] Outpatient Rehabilitation &  Therapy  [address]  P: [phone]  F: [phone]     Physical Therapy Discharge Note    Date: 10/8/2024      Patient: Yarelis Farr  : 2005  MRN: [id number]    Physician: Frankie Nickerson MD   Insurance: [facility] MEDICAID ( remains, Auth After 30vs)   Medical Diagnosis:   S32.10XD (ICD-10-CM) - Closed fracture of sacrum with routine healing, unspecified portion of sacrum, subsequent encounter   M54.50 (ICD-10-CM) - Acute midline low back pain without sciatica   Rehab Codes: M54.59, M25.651, M25.652, R29.3, M62.81  Onset Date: 3/15/24               Next 's appt.: 8/15/24  Visit# / total visits:           Cancels/No Shows: 0  Date of initial visit: 24

## 2024-10-14 ENCOUNTER — OFFICE VISIT (OUTPATIENT)
Dept: PAIN MANAGEMENT | Age: 19
End: 2024-10-14
Payer: MEDICAID

## 2024-10-14 VITALS — HEIGHT: 65 IN | WEIGHT: 110 LBS | BODY MASS INDEX: 18.33 KG/M2

## 2024-10-14 DIAGNOSIS — M47.817 LUMBOSACRAL SPONDYLOSIS WITHOUT MYELOPATHY: Primary | ICD-10-CM

## 2024-10-14 DIAGNOSIS — M51.360 DEGENERATION OF INTERVERTEBRAL DISC OF LUMBAR REGION WITH DISCOGENIC BACK PAIN: ICD-10-CM

## 2024-10-14 PROCEDURE — 99214 OFFICE O/P EST MOD 30 MIN: CPT | Performed by: PAIN MEDICINE

## 2024-10-14 ASSESSMENT — ENCOUNTER SYMPTOMS: BACK PAIN: 1

## 2024-10-14 NOTE — PROGRESS NOTES
HPI:     Back Pain  This is a chronic problem. The current episode started more than 1 year ago. The problem occurs constantly. The problem is unchanged. The pain is present in the lumbar spine. The quality of the pain is described as aching. The pain does not radiate. The pain is at a severity of 6/10. The pain is moderate. The pain is The same all the time. The symptoms are aggravated by position, twisting, bending, lying down, sitting, standing and stress. Stiffness is present In the morning. She has tried home exercises, bed rest, walking, chiropractic manipulation, ice, heat, muscle relaxant, NSAIDs and analgesics for the symptoms.       Chronic low back and tailbone pain.  Did slip and fall few months ago.  MRI with nondisplaced coccygeal fracture.  Tailbone pain seems to be much improved but continues to have lingering low back pain.  She has done physical therapy and continues to have lingering pain.  MRI lumbar spine with degenerative changes.  Did see the surgical team.  Nothing surgical planned.  We were considering diagnostic medial branch block but had some insurance issues, reports this has been resolved.    Pain ranges from a 5/10 to a 8/10 depending on activity.    Patient denies any new neurological symptoms. Nobowel or bladder incontinence, no weakness, and no falling.    Review of OARRS does not show any aberrant prescription behavior.       Past Medical History:   Diagnosis Date    Constipation     Depression        Past Surgical History:   Procedure Laterality Date    HYMENECTOMY      TONSILLECTOMY         Allergies   Allergen Reactions    Nitrofurantoin Hives     Joint swelling/hives 8 days after a course of Macrobid    Sulfa Antibiotics      EYE DROPS         Current Outpatient Medications:     QUEtiapine (SEROQUEL) 100 MG tablet, , Disp: , Rfl:     DULoxetine (CYMBALTA) 20 MG extended release capsule, Take 1 capsule by mouth daily, Disp: , Rfl:     History reviewed. No pertinent family

## 2024-10-31 ENCOUNTER — HOSPITAL ENCOUNTER (OUTPATIENT)
Dept: PAIN MANAGEMENT | Facility: CLINIC | Age: 19
Discharge: HOME | End: 2024-10-31
Payer: MEDICAID

## 2024-10-31 ENCOUNTER — TELEPHONE (OUTPATIENT)
Dept: PAIN MANAGEMENT | Age: 19
End: 2024-10-31

## 2024-10-31 VITALS
RESPIRATION RATE: 12 BRPM | DIASTOLIC BLOOD PRESSURE: 89 MMHG | BODY MASS INDEX: 18.33 KG/M2 | HEART RATE: 67 BPM | OXYGEN SATURATION: 99 % | HEIGHT: 65 IN | SYSTOLIC BLOOD PRESSURE: 130 MMHG | TEMPERATURE: 98 F | WEIGHT: 110 LBS

## 2024-10-31 DIAGNOSIS — R52 PAIN MANAGEMENT: ICD-10-CM

## 2024-10-31 LAB — HCG, PREGNANCY URINE (POC): NEGATIVE

## 2024-10-31 PROCEDURE — 6360000002 HC RX W HCPCS: Performed by: PAIN MEDICINE

## 2024-10-31 PROCEDURE — 64494 INJ PARAVERT F JNT L/S 2 LEV: CPT

## 2024-10-31 PROCEDURE — 81025 URINE PREGNANCY TEST: CPT

## 2024-10-31 PROCEDURE — 64493 INJ PARAVERT F JNT L/S 1 LEV: CPT

## 2024-10-31 PROCEDURE — 2500000003 HC RX 250 WO HCPCS: Performed by: PAIN MEDICINE

## 2024-10-31 RX ORDER — BUPIVACAINE HYDROCHLORIDE 2.5 MG/ML
INJECTION, SOLUTION EPIDURAL; INFILTRATION; INTRACAUDAL
Status: COMPLETED | OUTPATIENT
Start: 2024-10-31 | End: 2024-10-31

## 2024-10-31 RX ORDER — LIDOCAINE HYDROCHLORIDE 5 MG/ML
INJECTION, SOLUTION INFILTRATION; INTRAVENOUS
Status: COMPLETED | OUTPATIENT
Start: 2024-10-31 | End: 2024-10-31

## 2024-10-31 RX ORDER — MIDAZOLAM HYDROCHLORIDE 2 MG/2ML
INJECTION, SOLUTION INTRAMUSCULAR; INTRAVENOUS
Status: COMPLETED | OUTPATIENT
Start: 2024-10-31 | End: 2024-10-31

## 2024-10-31 RX ORDER — DROSPIRENONE AND ETHINYL ESTRADIOL 0.02-3(28)
1 KIT ORAL DAILY
COMMUNITY

## 2024-10-31 RX ADMIN — LIDOCAINE HYDROCHLORIDE 6 ML: 5 INJECTION, SOLUTION INFILTRATION at 15:09

## 2024-10-31 RX ADMIN — BUPIVACAINE HYDROCHLORIDE 10 ML: 2.5 INJECTION, SOLUTION EPIDURAL; INFILTRATION; INTRACAUDAL; PERINEURAL at 15:10

## 2024-10-31 RX ADMIN — MIDAZOLAM HYDROCHLORIDE 2 MG: 1 INJECTION, SOLUTION INTRAMUSCULAR; INTRAVENOUS at 15:06

## 2024-10-31 ASSESSMENT — PAIN - FUNCTIONAL ASSESSMENT
PAIN_FUNCTIONAL_ASSESSMENT: 0-10
PAIN_FUNCTIONAL_ASSESSMENT: 0-10
PAIN_FUNCTIONAL_ASSESSMENT: PREVENTS OR INTERFERES SOME ACTIVE ACTIVITIES AND ADLS

## 2024-10-31 ASSESSMENT — PAIN DESCRIPTION - DESCRIPTORS
DESCRIPTORS: SHARP;SORE
DESCRIPTORS: ACHING

## 2024-10-31 NOTE — DISCHARGE INSTRUCTIONS
You have received a sedative/anesthetic therefore you should not consume any alcoholic beverages for 24 hours.  Do not drive or operate machinery for 24 hours.   Do not take a tub bath for 72 hours after procedure (this includes hot tubs).  You may shower, but avoid hot water to injection site.   Avoid strenuous activity TODAY especially if you experience dizziness.   Remove band-aid the next day.    Wash off any residual iodine 24 hours from today.   Do not use heat, heating pad, or any other heating device over the injection site for 3 days after the procedure.    If you experience pain after your procedure, you may continue with your current pain medication as prescribed.  (DO NOT INCREASE YOUR PAIN MEDICATION WITHOUT TALKING TO DOCTOR)  Soreness and pain at injection site is common, may use ice to reduce soreness.    Please complete pain diary as instructed. The office staff will call you to discuss the results of the procedure within 24 hours, please call the office if you do not hear from them.      Call [facility] at [phone] if you experience:   Fever, chills or temperature over 100    Vomiting, headache, persistent stiff neck, nausea or blurred vision   Difficulty urinating or unable to urinate within 8 hours   Increase in weakness, numbness or loss of function of limbs  Increased redness, swelling or drainage at the injection site

## 2024-10-31 NOTE — H&P
Pain Pre-Op H&P Note    Mattie Taylor MD    HPI: Yarelis Farr  presents with back pain.  Wishes to proceed with medial branch block.    Past Medical History:   Diagnosis Date    Constipation     Depression        Past Surgical History:   Procedure Laterality Date    HYMENECTOMY      TONSILLECTOMY         History reviewed. No pertinent family history.    Allergies   Allergen Reactions    Nitrofurantoin Hives     Joint swelling/hives 8 days after a course of Macrobid    Sulfa Antibiotics      EYE DROPS         Current Outpatient Medications:     drospirenone-ethinyl estradiol (ONEYDA) 3-0.02 MG per tablet, Take 1 tablet by mouth daily, Disp: , Rfl:     QUEtiapine (SEROQUEL) 100 MG tablet, , Disp: , Rfl:     DULoxetine (CYMBALTA) 20 MG extended release capsule, Take 1 capsule by mouth daily, Disp: , Rfl:     Social History     Tobacco Use    Smoking status: Never     Passive exposure: Never    Smokeless tobacco: Never   Substance Use Topics    Alcohol use: No       Review of Systems:   Focused review of systems was performed, and negative as pertinent to diagnosis, except as stated in HPI.      Physical Exam  Constitutional:       Appearance: Normal appearance.   Pulmonary:      Effort: Pulmonary effort is normal.   Neurological:      Mental Status: alert.   Psychiatric:         Attention and Perception: Attention and perception normal.         Mood and Affect: Mood and affect normal.   Cardiovascular:      Rate: Normal rate.         ASA: 3          Mallampati: 2       Patient's current physical status, medications, medical history, and HPI have been reviewed and updated as appropriate on this date: 10/31/24    Risk/Benefit(s): The risks, benefits, alternatives, and potential complications have been discussed with the patient/family and informed consent has been obtained for the procedure/sedation.    Diagnosis:   Spondylosis      Plan: SHIRLEY Taylor MD

## 2024-10-31 NOTE — OP NOTE
Lumbar Facet Nerve Block Injection:  Surgeon: Mattie Taylor MD     PRE-OP DIAGNOSIS: M47.817 (lumbosacral spondylosis), M54.5 (low back pain)    POST-OP DIAGNOSIS: Same.    PROCEDURE PERFORMED: Lumbar Facet Nerve Block Multiple Levels  Bilateral L4 - 5 and L5 - S1.     Physician confirmed and marked the surgical site.    EBL: minimal      CONSENT: Patient has undergone the educational process with this procedure, is aware and fully understands the risks involved: potential damage to any and all body organs including possible bleeding, infection and nerve injury, allergic reaction and headache. Patient also understands that the procedure will be undertaken in a safe, controlled, and monitored setting. Patient recognizes that the benefits include relief from pain and reduction in the oral use of medications. Patient agreed to proceed.    Risks, benefits, and alternatives including postponing the procedure were discussed. The patient does wish to proceed with the procedure at this time.      PREP: Timeout was performed prior to starting the procedure. The patient's back was prepped with chloroprep and draped appropriately. 0.5% lidocaine was used to anesthetize the skin and subcutaneous tissue.     PROCEDURE NOTE: 25 gauge spinal needles were advanced under  fluoroscopic guidance to the appropriate anatomic location for the medial branches and/or dorsal ramus corresponding to the indicated facet joints. Aspiration was negative. 1 ml of 0.25% marcaine was then injected at each site to block medial branch nerve innervating the  Bilateral L4 - 5 and L5 - S1 facet joints.    Patient tolerated the procedure well, no complications occurred.    At the end of the injection the physician withdrew the needle and the nurse applied a sterile bandage to the site. Patient transferred to the recovery room in satisfactory condition. Appropriate written discharge instructions were given to the patient. If good results are obtained,

## 2024-10-31 NOTE — TELEPHONE ENCOUNTER
Pt calling asking if she can wear a back brace, not today as she just had a procedure. But generally speaking.     Please advise.   
Episodes of diarrhea (more than 3 times a day), or if you are unable to tolerate food or fluids

## 2024-11-01 ENCOUNTER — TELEPHONE (OUTPATIENT)
Dept: PAIN MANAGEMENT | Age: 19
End: 2024-11-01

## 2024-11-01 NOTE — TELEPHONE ENCOUNTER
LVM for MBB diary.    Assessment: 36 yr  @ 34 3/7 with gestational hypertension, no evidence of severe features  HELLP labs normal   Asymptomatic  Fetal growth restriction diagnosed yesterday 8% centile, however growth today 17% centile  Overnight placed on nifedipine for tocolysis, unable to assess blood pressure accurately   s/p BMZ 2021   Low suspicion for  labor as patient has made no cervical change.       Recommendations    Would recommend to discontinue procardia for tocolysis as anti hypertensive medication can mask severe range blood pressure.   Would recommend to monitor blood pressure until tomorrow after discontinuing procardia to determine if blood pressure are in severe range.   If blood pressure do become severe (systolic >160 and/or diastolic >110), patient develops PEC symptoms, or HELLP labs become abnormal, would recommend to proceed with delivery at that time .  Complete 24 hour protein collection   Administer 2nd BMZ today    Continue NST BID and 2x weekly BPP while inpatient   Recommend delivery at 37 weeks, or earlier if indicated     Seen and evaluated with Dr. Dickerson (M Attending)     Meredith Godoy MD   M Fellow.

## 2024-11-14 ENCOUNTER — HOSPITAL ENCOUNTER (OUTPATIENT)
Dept: PAIN MANAGEMENT | Facility: CLINIC | Age: 19
Discharge: HOME | End: 2024-11-14
Payer: MEDICAID

## 2024-11-14 VITALS
WEIGHT: 110 LBS | HEIGHT: 65 IN | RESPIRATION RATE: 16 BRPM | SYSTOLIC BLOOD PRESSURE: 141 MMHG | TEMPERATURE: 98.6 F | DIASTOLIC BLOOD PRESSURE: 91 MMHG | OXYGEN SATURATION: 100 % | BODY MASS INDEX: 18.33 KG/M2 | HEART RATE: 74 BPM

## 2024-11-14 DIAGNOSIS — R52 PAIN MANAGEMENT: ICD-10-CM

## 2024-11-14 LAB — HCG, PREGNANCY URINE (POC): NEGATIVE

## 2024-11-14 PROCEDURE — 81025 URINE PREGNANCY TEST: CPT

## 2024-11-14 PROCEDURE — 2500000003 HC RX 250 WO HCPCS: Performed by: PAIN MEDICINE

## 2024-11-14 PROCEDURE — 64493 INJ PARAVERT F JNT L/S 1 LEV: CPT

## 2024-11-14 PROCEDURE — 6360000002 HC RX W HCPCS: Performed by: PAIN MEDICINE

## 2024-11-14 PROCEDURE — 64494 INJ PARAVERT F JNT L/S 2 LEV: CPT

## 2024-11-14 RX ORDER — LIDOCAINE HYDROCHLORIDE 5 MG/ML
INJECTION, SOLUTION INFILTRATION; INTRAVENOUS
Status: COMPLETED | OUTPATIENT
Start: 2024-11-14 | End: 2024-11-14

## 2024-11-14 RX ORDER — MIDAZOLAM HYDROCHLORIDE 2 MG/2ML
INJECTION, SOLUTION INTRAMUSCULAR; INTRAVENOUS
Status: COMPLETED | OUTPATIENT
Start: 2024-11-14 | End: 2024-11-14

## 2024-11-14 RX ORDER — BUPIVACAINE HYDROCHLORIDE 2.5 MG/ML
INJECTION, SOLUTION EPIDURAL; INFILTRATION; INTRACAUDAL
Status: COMPLETED | OUTPATIENT
Start: 2024-11-14 | End: 2024-11-14

## 2024-11-14 RX ADMIN — MIDAZOLAM HYDROCHLORIDE 2 MG: 1 INJECTION, SOLUTION INTRAMUSCULAR; INTRAVENOUS at 09:44

## 2024-11-14 RX ADMIN — BUPIVACAINE HYDROCHLORIDE 10 ML: 2.5 INJECTION, SOLUTION EPIDURAL; INFILTRATION; INTRACAUDAL; PERINEURAL at 09:49

## 2024-11-14 RX ADMIN — LIDOCAINE HYDROCHLORIDE 6 ML: 5 INJECTION, SOLUTION INFILTRATION at 09:48

## 2024-11-14 ASSESSMENT — PAIN DESCRIPTION - DESCRIPTORS: DESCRIPTORS: ACHING

## 2024-11-14 ASSESSMENT — PAIN - FUNCTIONAL ASSESSMENT
PAIN_FUNCTIONAL_ASSESSMENT: 0-10
PAIN_FUNCTIONAL_ASSESSMENT: PREVENTS OR INTERFERES SOME ACTIVE ACTIVITIES AND ADLS

## 2024-11-15 ENCOUNTER — TELEPHONE (OUTPATIENT)
Dept: PAIN MANAGEMENT | Age: 19
End: 2024-11-15

## 2024-11-15 NOTE — TELEPHONE ENCOUNTER
S/P: MBB #2 L4/5, L5/S1     DOS: 11/14/2024    Pain  before procedure with activity : 6    Pain after procedure with activity: 2    Activities following procedure include: walking up the stairs, lilfing    % of pain relief: 80    Procedure successful: Yes    OR scheduled:

## 2024-11-21 ENCOUNTER — OFFICE VISIT (OUTPATIENT)
Dept: ORTHOPEDIC SURGERY | Age: 19
End: 2024-11-21
Payer: MEDICAID

## 2024-11-21 VITALS — BODY MASS INDEX: 18.33 KG/M2 | HEIGHT: 65 IN | WEIGHT: 110 LBS | RESPIRATION RATE: 16 BRPM

## 2024-11-21 DIAGNOSIS — M54.50 CHRONIC MIDLINE LOW BACK PAIN WITHOUT SCIATICA: Primary | ICD-10-CM

## 2024-11-21 DIAGNOSIS — G89.29 CHRONIC MIDLINE LOW BACK PAIN WITHOUT SCIATICA: Primary | ICD-10-CM

## 2024-11-21 PROCEDURE — 99213 OFFICE O/P EST LOW 20 MIN: CPT | Performed by: ORTHOPAEDIC SURGERY

## 2024-11-21 NOTE — PROGRESS NOTES
Patient ID: Yarelis Farr is a 19 y.o. female    Chief Compliant:  No chief complaint on file.       Diagnostic imaging:    Lateral flexion and extension lumbar spine normal no pathologic motion    Assessment and Plan:  1. Chronic midline low back pain without sciatica        Midline low back pain      Patient is following with pain management that is providing great relief. Her pain level is down to around a 2/10 which was around 8 before    Continue with pain management    Follow up prn    HPI:  This is a 19 y.o. female who presents to the clinic today for follow up of coccyx and midline low back pain.     Patient is following with pain management that is providing great relief. Her pain level is down to around a 2/10 which was around 8 before    Review of Systems   All other systems reviewed and are negative.      Past History:    Current Outpatient Medications:     drospirenone-ethinyl estradiol (ONEYDA) 3-0.02 MG per tablet, Take 1 tablet by mouth daily, Disp: , Rfl:     QUEtiapine (SEROQUEL) 100 MG tablet, , Disp: , Rfl:     DULoxetine (CYMBALTA) 20 MG extended release capsule, Take 1 capsule by mouth daily, Disp: , Rfl:   Allergies   Allergen Reactions    Nitrofurantoin Hives     Joint swelling/hives 8 days after a course of Macrobid    Sulfa Antibiotics      EYE DROPS     Social History     Socioeconomic History    Marital status: Single     Spouse name: Not on file    Number of children: Not on file    Years of education: Not on file    Highest education level: Not on file   Occupational History    Not on file   Tobacco Use    Smoking status: Never     Passive exposure: Never    Smokeless tobacco: Never   Substance and Sexual Activity    Alcohol use: No    Drug use: No    Sexual activity: Never   Other Topics Concern    Not on file   Social History Narrative    Not on file     Social Determinants of Health     Financial Resource Strain: Not on file   Food Insecurity: No Food Insecurity (9/11/2024)

## 2024-12-05 ENCOUNTER — HOSPITAL ENCOUNTER (OUTPATIENT)
Dept: PAIN MANAGEMENT | Facility: CLINIC | Age: 19
Discharge: HOME | End: 2024-12-05
Payer: MEDICAID

## 2024-12-05 VITALS
BODY MASS INDEX: 18.33 KG/M2 | HEIGHT: 65 IN | OXYGEN SATURATION: 100 % | DIASTOLIC BLOOD PRESSURE: 76 MMHG | SYSTOLIC BLOOD PRESSURE: 129 MMHG | HEART RATE: 71 BPM | TEMPERATURE: 98.4 F | RESPIRATION RATE: 17 BRPM | WEIGHT: 110 LBS

## 2024-12-05 DIAGNOSIS — R52 PAIN MANAGEMENT: ICD-10-CM

## 2024-12-05 LAB — HCG, PREGNANCY URINE (POC): NEGATIVE

## 2024-12-05 PROCEDURE — 64636 DESTROY L/S FACET JNT ADDL: CPT | Performed by: PAIN MEDICINE

## 2024-12-05 PROCEDURE — 64635 DESTROY LUMB/SAC FACET JNT: CPT

## 2024-12-05 PROCEDURE — 64636 DESTROY L/S FACET JNT ADDL: CPT

## 2024-12-05 PROCEDURE — 81025 URINE PREGNANCY TEST: CPT

## 2024-12-05 PROCEDURE — 64635 DESTROY LUMB/SAC FACET JNT: CPT | Performed by: PAIN MEDICINE

## 2024-12-05 PROCEDURE — 6360000002 HC RX W HCPCS: Performed by: PAIN MEDICINE

## 2024-12-05 RX ORDER — MIDAZOLAM HYDROCHLORIDE 2 MG/2ML
INJECTION, SOLUTION INTRAMUSCULAR; INTRAVENOUS
Status: COMPLETED | OUTPATIENT
Start: 2024-12-05 | End: 2024-12-05

## 2024-12-05 RX ORDER — BUPIVACAINE HYDROCHLORIDE 2.5 MG/ML
INJECTION, SOLUTION EPIDURAL; INFILTRATION; INTRACAUDAL
Status: COMPLETED | OUTPATIENT
Start: 2024-12-05 | End: 2024-12-05

## 2024-12-05 RX ORDER — FENTANYL CITRATE 50 UG/ML
INJECTION, SOLUTION INTRAMUSCULAR; INTRAVENOUS
Status: COMPLETED | OUTPATIENT
Start: 2024-12-05 | End: 2024-12-05

## 2024-12-05 RX ORDER — LIDOCAINE HYDROCHLORIDE 5 MG/ML
INJECTION, SOLUTION INFILTRATION; INTRAVENOUS
Status: COMPLETED | OUTPATIENT
Start: 2024-12-05 | End: 2024-12-05

## 2024-12-05 RX ADMIN — BUPIVACAINE HYDROCHLORIDE 5 ML: 2.5 INJECTION, SOLUTION EPIDURAL; INFILTRATION; INTRACAUDAL; PERINEURAL at 09:42

## 2024-12-05 RX ADMIN — FENTANYL CITRATE 25 MCG: 50 INJECTION, SOLUTION INTRAMUSCULAR; INTRAVENOUS at 09:42

## 2024-12-05 RX ADMIN — FENTANYL CITRATE 25 MCG: 50 INJECTION, SOLUTION INTRAMUSCULAR; INTRAVENOUS at 09:38

## 2024-12-05 RX ADMIN — LIDOCAINE HYDROCHLORIDE 5 ML: 5 INJECTION, SOLUTION INFILTRATION at 09:37

## 2024-12-05 RX ADMIN — MIDAZOLAM HYDROCHLORIDE 2 MG: 1 INJECTION, SOLUTION INTRAMUSCULAR; INTRAVENOUS at 09:33

## 2024-12-05 ASSESSMENT — PAIN - FUNCTIONAL ASSESSMENT
PAIN_FUNCTIONAL_ASSESSMENT: 0-10
PAIN_FUNCTIONAL_ASSESSMENT: PREVENTS OR INTERFERES WITH MANY ACTIVE NOT PASSIVE ACTIVITIES
PAIN_FUNCTIONAL_ASSESSMENT: NONE - DENIES PAIN

## 2024-12-05 ASSESSMENT — PAIN DESCRIPTION - DESCRIPTORS: DESCRIPTORS: ACHING

## 2024-12-05 NOTE — H&P
Pain Pre-Op H&P Note    Mattie Taylor MD    HPI: Yarelis Farr  presents with back pain.  Reports 80% or more temporary benefit with diagnostic medial branch block x 2 and wishes to proceed with radiofrequency ablation.    Reports to me that pain can get up to a 7 out of 10 with activity and is quite bothersome to her and she is eager to proceed with RFA.    Past Medical History:   Diagnosis Date    Constipation     Depression        Past Surgical History:   Procedure Laterality Date    HYMENECTOMY      TONSILLECTOMY         No family history on file.    Allergies   Allergen Reactions    Nitrofurantoin Hives     Joint swelling/hives 8 days after a course of Macrobid    Sulfa Antibiotics      EYE DROPS         Current Outpatient Medications:     drospirenone-ethinyl estradiol (ONEYDA) 3-0.02 MG per tablet, Take 1 tablet by mouth daily, Disp: , Rfl:     QUEtiapine (SEROQUEL) 100 MG tablet, , Disp: , Rfl:     DULoxetine (CYMBALTA) 20 MG extended release capsule, Take 1 capsule by mouth daily, Disp: , Rfl:     Social History     Tobacco Use    Smoking status: Never     Passive exposure: Never    Smokeless tobacco: Never   Substance Use Topics    Alcohol use: No       Review of Systems:   Focused review of systems was performed, and negative as pertinent to diagnosis, except as stated in HPI.      Physical Exam  Constitutional:       Appearance: Normal appearance.   Pulmonary:      Effort: Pulmonary effort is normal.   Neurological:      Mental Status: alert.   Psychiatric:         Attention and Perception: Attention and perception normal.         Mood and Affect: Mood and affect normal.   Cardiovascular:      Rate: Normal rate.         ASA: 2          Mallampati: 2      Patient's current physical status, medications, medical history, and HPI have been reviewed and updated as appropriate on this date: 12/05/24    Risk/Benefit(s): The risks, benefits, alternatives, and potential complications have been discussed

## 2024-12-05 NOTE — DISCHARGE INSTRUCTIONS
You have received a sedative/anesthetic therefore you should not consume any alcoholic beverages for 24 hours.  Do not drive or operate machinery for 24 hours.   Do not take a tub bath for 72 hours after procedure (this includes hot tubs).  You may shower, but avoid hot water to injection site.   Avoid strenuous activity TODAY especially if you experience dizziness.   Remove band-aid the next day.    Wash off any residual iodine 24 hours from today.   Do not use heat, heating pad, or any other heating device over the injection site for 3 days after the procedure.    If you experience pain after your procedure, you may continue with your current pain medication as prescribed.  (DO NOT INCREASE YOUR PAIN MEDICATION WITHOUT TALKING TO DOCTOR)  Soreness and pain at injection site is common, may use ice to reduce soreness.    Call [facility] at [phone] if you experience:   Fever, chills or temperature over 100    Vomiting, headache, persistent stiff neck, nausea or blurred vision   Difficulty urinating or unable to urinate within 8 hours   Increase in weakness, numbness or loss of function of limbs  Increased redness, swelling or drainage at the injection site

## 2024-12-05 NOTE — OP NOTE
5 and L5 - S1 . Position confirmed with fluoroscopy.  Aspiration was negative. Motor stimulation checked at 2hz up to 3V and confirmed negative for radicular stimulation. After confirmation of the needle placement the patient received 1 ml of 0.25% marcaine to provide anesthesia. Radiofrequency was delivered to the lumbar region at 80 degrees for a limit of 90 seconds in length with no ill effect.     The patient tolerated the procedure well and was transported to the recovery room where the patient was monitored with no complications and with stable vital signs. Patient was discharged with appropriate written discharge instructions. Follow-up discussed.      Mattie Taylor MD

## 2024-12-12 ENCOUNTER — HOSPITAL ENCOUNTER (OUTPATIENT)
Dept: PAIN MANAGEMENT | Facility: CLINIC | Age: 19
Discharge: HOME | End: 2024-12-12
Payer: MEDICAID

## 2024-12-12 VITALS
OXYGEN SATURATION: 97 % | RESPIRATION RATE: 11 BRPM | DIASTOLIC BLOOD PRESSURE: 89 MMHG | BODY MASS INDEX: 18.33 KG/M2 | HEIGHT: 65 IN | HEART RATE: 61 BPM | SYSTOLIC BLOOD PRESSURE: 133 MMHG | WEIGHT: 110 LBS | TEMPERATURE: 97 F

## 2024-12-12 DIAGNOSIS — R52 PAIN MANAGEMENT: ICD-10-CM

## 2024-12-12 LAB — HCG, PREGNANCY URINE (POC): NEGATIVE

## 2024-12-12 PROCEDURE — 64635 DESTROY LUMB/SAC FACET JNT: CPT

## 2024-12-12 PROCEDURE — 64636 DESTROY L/S FACET JNT ADDL: CPT | Performed by: PAIN MEDICINE

## 2024-12-12 PROCEDURE — 64635 DESTROY LUMB/SAC FACET JNT: CPT | Performed by: PAIN MEDICINE

## 2024-12-12 PROCEDURE — 64636 DESTROY L/S FACET JNT ADDL: CPT

## 2024-12-12 PROCEDURE — 6360000002 HC RX W HCPCS: Performed by: PAIN MEDICINE

## 2024-12-12 PROCEDURE — 81025 URINE PREGNANCY TEST: CPT

## 2024-12-12 RX ORDER — MIDAZOLAM HYDROCHLORIDE 2 MG/2ML
INJECTION, SOLUTION INTRAMUSCULAR; INTRAVENOUS
Status: COMPLETED | OUTPATIENT
Start: 2024-12-12 | End: 2024-12-12

## 2024-12-12 RX ORDER — LIDOCAINE HYDROCHLORIDE 5 MG/ML
INJECTION, SOLUTION INFILTRATION; INTRAVENOUS
Status: COMPLETED | OUTPATIENT
Start: 2024-12-12 | End: 2024-12-12

## 2024-12-12 RX ORDER — BUPIVACAINE HYDROCHLORIDE 2.5 MG/ML
INJECTION, SOLUTION EPIDURAL; INFILTRATION; INTRACAUDAL
Status: COMPLETED | OUTPATIENT
Start: 2024-12-12 | End: 2024-12-12

## 2024-12-12 RX ORDER — FENTANYL CITRATE 50 UG/ML
INJECTION, SOLUTION INTRAMUSCULAR; INTRAVENOUS
Status: COMPLETED | OUTPATIENT
Start: 2024-12-12 | End: 2024-12-12

## 2024-12-12 RX ADMIN — MIDAZOLAM HYDROCHLORIDE 2 MG: 1 INJECTION, SOLUTION INTRAMUSCULAR; INTRAVENOUS at 09:15

## 2024-12-12 RX ADMIN — BUPIVACAINE HYDROCHLORIDE 5 ML: 2.5 INJECTION, SOLUTION EPIDURAL; INFILTRATION; INTRACAUDAL; PERINEURAL at 09:21

## 2024-12-12 RX ADMIN — FENTANYL CITRATE 25 MCG: 50 INJECTION, SOLUTION INTRAMUSCULAR; INTRAVENOUS at 09:16

## 2024-12-12 RX ADMIN — LIDOCAINE HYDROCHLORIDE 6 ML: 5 INJECTION, SOLUTION INFILTRATION at 09:16

## 2024-12-12 ASSESSMENT — PAIN DESCRIPTION - DESCRIPTORS: DESCRIPTORS: ACHING

## 2024-12-12 NOTE — OP NOTE
Lumbar Radiofrequency Ablation:  SURGEON: Mattie Taylor MD    PRE-OP DIAGNOSIS: M47.817 (lumbosacral spondylosis), M54.5 (low back pain)    POST-OP DIAGNOSIS: Same.    PROCEDURE PERFORMED: Radiofrequency Ablation Medial Branch Nerve at Left L4 - 5 and L5 - S1 facet joint(s).    HISTORY AND INDICATIONS: Satisfactory response with previous RFA/ medial branch blocks at the indicated levels.    Recurrence of painful symptoms attributed to the above diagnosis.    The planned treatment is medically necessary to relieve pain, restore function and or reduce reliance on pain medication.    EBL: minimal    CONSENT: Patient has undergone the educational process with this procedure, is aware and fully understands the risks involved: potential damage to any and all body organs including possible bleeding, infection, nerve injury, paralysis, allergic reaction and headache. Patient also understands that the procedure will be undertaken in a safe, controlled and monitored setting. Patient recognizes that the benefits may include relief from pain and reduction in the oral use of medications. Patient agreed to proceed.     Risks, benefits, and alternatives including postponing the procedure were discussed. The patient does wish to proceed with the procedure at this time.      PROCEDURE NOTE: The patient was taken to the procedure room and placed prone with the appropriate padding and positioning to assure patient comfort and physician access to the procedure site. Time out was performed prior to starting the procedure. Fluoroscopic evaluation was utilized to target the appropriate treatment areas.The skin was prepped with antiseptic solution and draped sterilely. 0.5% lidocaine was used to used anesthetize the skin and subcutaneous tissue.  Under fluoroscopic guidance 22 gauge x 5mm active tip needles were advanced to the medial branch and/or dorsal ramus at the indicated levels to innervate the following facet joints Left L4 - 5

## 2024-12-12 NOTE — DISCHARGE INSTRUCTIONS
You have received a sedative/anesthetic therefore you should not consume any alcoholic beverages for 24 hours.  Do not drive or operate machinery for 24 hours.   Do not take a tub bath for 72 hours after procedure (this includes hot tubs).  You may shower, but avoid hot water to injection site.   Avoid strenuous activity TODAY especially if you experience dizziness.   Remove band-aid the next day.    Wash off any residual iodine 24 hours from today.   Do not use heat, heating pad, or any other heating device over the injection site for 3 days after the procedure.    If you experience pain after your procedure, you may continue with your current pain medication as prescribed.  (DO NOT INCREASE YOUR PAIN MEDICATION WITHOUT TALKING TO DOCTOR)  Soreness and pain at injection site is common, may use ice to reduce soreness.    Call [facility] at [phone] if you experience:   Fever, chills or temperature over 100    Vomiting, headache, persistent stiff neck, nausea or blurred vision   Difficulty urinating or unable to urinate within 8 hours   Increase in weakness, numbness or loss of function of limbs  Increased redness, swelling or drainage at the injection site

## 2024-12-12 NOTE — H&P
Pain Pre-Op H&P Note    Mattie Taylor MD    HPI: Yarelis Farr  presents with back pain, wishes to proceed with radiofrequency ablation.    Past Medical History:   Diagnosis Date    Constipation     Depression        Past Surgical History:   Procedure Laterality Date    HYMENECTOMY      TONSILLECTOMY         History reviewed. No pertinent family history.    Allergies   Allergen Reactions    Nitrofurantoin Hives     Joint swelling/hives 8 days after a course of Macrobid    Sulfa Antibiotics      EYE DROPS         Current Outpatient Medications:     drospirenone-ethinyl estradiol (ONEYDA) 3-0.02 MG per tablet, Take 1 tablet by mouth daily, Disp: , Rfl:     QUEtiapine (SEROQUEL) 100 MG tablet, , Disp: , Rfl:     DULoxetine (CYMBALTA) 20 MG extended release capsule, Take 1 capsule by mouth daily, Disp: , Rfl:     Social History     Tobacco Use    Smoking status: Never     Passive exposure: Never    Smokeless tobacco: Never   Substance Use Topics    Alcohol use: No       Review of Systems:   Focused review of systems was performed, and negative as pertinent to diagnosis, except as stated in HPI.      Physical Exam  Constitutional:       Appearance: Normal appearance.   Pulmonary:      Effort: Pulmonary effort is normal.   Neurological:      Mental Status: alert.   Psychiatric:         Attention and Perception: Attention and perception normal.         Mood and Affect: Mood and affect normal.   Cardiovascular:      Rate: Normal rate.         ASA: 3          Mallampati: 2       Patient's current physical status, medications, medical history, and HPI have been reviewed and updated as appropriate on this date: 12/12/24    Risk/Benefit(s): The risks, benefits, alternatives, and potential complications have been discussed with the patient/family and informed consent has been obtained for the procedure/sedation.    Diagnosis:   Spondylosis      Plan: RFA        Mattie Taylor MD

## 2025-01-06 ENCOUNTER — APPOINTMENT (OUTPATIENT)
Dept: GENERAL RADIOLOGY | Facility: CLINIC | Age: 20
End: 2025-01-06
Attending: EMERGENCY MEDICINE
Payer: MEDICAID

## 2025-01-06 ENCOUNTER — HOSPITAL ENCOUNTER (EMERGENCY)
Facility: CLINIC | Age: 20
Discharge: HOME | End: 2025-01-06
Attending: EMERGENCY MEDICINE
Payer: MEDICAID

## 2025-01-06 VITALS
RESPIRATION RATE: 16 BRPM | SYSTOLIC BLOOD PRESSURE: 140 MMHG | TEMPERATURE: 97.7 F | HEART RATE: 75 BPM | OXYGEN SATURATION: 100 % | HEIGHT: 65 IN | BODY MASS INDEX: 18.33 KG/M2 | WEIGHT: 110 LBS | DIASTOLIC BLOOD PRESSURE: 88 MMHG

## 2025-01-06 DIAGNOSIS — J06.9 ACUTE UPPER RESPIRATORY INFECTION: Primary | ICD-10-CM

## 2025-01-06 PROCEDURE — 71046 X-RAY EXAM CHEST 2 VIEWS: CPT

## 2025-01-06 PROCEDURE — 99283 EMERGENCY DEPT VISIT LOW MDM: CPT

## 2025-01-06 ASSESSMENT — ENCOUNTER SYMPTOMS
GASTROINTESTINAL NEGATIVE: 1
WHEEZING: 0
STRIDOR: 0
COUGH: 1
EYES NEGATIVE: 1

## 2025-01-06 ASSESSMENT — PAIN - FUNCTIONAL ASSESSMENT: PAIN_FUNCTIONAL_ASSESSMENT: NONE - DENIES PAIN

## 2025-01-06 NOTE — ED PROVIDER NOTES
[facility]  [address] [address]  Phone: [phone]        [facility] EMERGENCY DEPARTMENT  EMERGENCY DEPARTMENT ENCOUNTER      Pt Name: Yarelis Farr  MRN: [id number]  Birthdate 2005  Date of evaluation: 1/6/2025  Provider: Aby Davison MD    CHIEF COMPLAINT       Chief Complaint   Patient presents with    Cold Symptoms         HISTORY OF PRESENT ILLNESS   (Location/Symptom, Timing/Onset,Context/Setting, Quality, Duration, Modifying Factors, Severity)  Note limiting factors.   Yarelis Farr is a 19 y.o. female who presents to the emergency department complaints of 1-1/2-week of sneezing coughing nasal congestion.  There is some nausea no vomiting or diarrhea.  No documented fevers or chills but feels hot and cold and occasionally sweaty.  She has pain in the chest central region with and without cough.  No abdominal pain no calf pain no other complaints.   Tried over-the-counter cough cold preparations without relief and is also taking ibuprofen.    No diabetes does not smoke cigarettes.  Is not on blood thinners is on birth control pills Seroquel and Cymbalta.   Last period within the last week and a half.    Nursing Notes were reviewed.    REVIEW OF SYSTEMS    (2-9systems for level 4, 10 or more for level 5)     Review of Systems   Constitutional: Negative.    HENT:  Positive for congestion and sneezing.    Eyes: Negative.    Respiratory:  Positive for cough. Negative for wheezing and stridor.    Cardiovascular:  Positive for chest pain. Negative for leg swelling.   Gastrointestinal: Negative.    Genitourinary: Negative.    Musculoskeletal: Negative.    Neurological: Negative.    Psychiatric/Behavioral: Negative.         Except asnoted above the remainder of the review of systems was reviewed and negative.       PAST MEDICAL HISTORY     Past Medical History:   Diagnosis Date    Constipation     Depression          SURGICAL HISTORY       Past

## 2025-01-06 NOTE — ED TRIAGE NOTES
Reports cold symptoms x1.5 weeks. Reports feeling nasal congestion going down her throat into her chest.

## 2025-01-06 NOTE — DISCHARGE INSTRUCTIONS
Continue over-the-counter cough and cold preparations.  Return if high fevers increasing pain shortness of breath leg swelling or any other concerns.  Otherwise follow-up with your primary care physician in 2 to 3 days if no better

## 2025-01-20 ENCOUNTER — HOSPITAL ENCOUNTER (EMERGENCY)
Facility: CLINIC | Age: 20
Discharge: HOME | End: 2025-01-20
Attending: EMERGENCY MEDICINE
Payer: MEDICAID

## 2025-01-20 VITALS
RESPIRATION RATE: 14 BRPM | HEIGHT: 65 IN | HEART RATE: 73 BPM | SYSTOLIC BLOOD PRESSURE: 124 MMHG | OXYGEN SATURATION: 100 % | DIASTOLIC BLOOD PRESSURE: 71 MMHG | WEIGHT: 110 LBS | TEMPERATURE: 98.1 F | BODY MASS INDEX: 18.33 KG/M2

## 2025-01-20 DIAGNOSIS — N39.0 URINARY TRACT INFECTION WITHOUT HEMATURIA, SITE UNSPECIFIED: ICD-10-CM

## 2025-01-20 DIAGNOSIS — R19.7 DIARRHEA, UNSPECIFIED TYPE: Primary | ICD-10-CM

## 2025-01-20 LAB
BACTERIA URNS QL MICRO: ABNORMAL
BILIRUB UR QL STRIP: NEGATIVE
CHARACTER UR: ABNORMAL
CLARITY UR: ABNORMAL
COLOR UR: YELLOW
EPI CELLS #/AREA URNS HPF: ABNORMAL /HPF (ref 0–5)
GLUCOSE UR STRIP-MCNC: NEGATIVE MG/DL
HCG UR QL: NEGATIVE
HGB UR QL STRIP.AUTO: NEGATIVE
KETONES UR STRIP-MCNC: NEGATIVE MG/DL
LEUKOCYTE ESTERASE UR QL STRIP: ABNORMAL
MUCOUS THREADS URNS QL MICRO: ABNORMAL
NITRITE UR QL STRIP: NEGATIVE
PH UR STRIP: 5.5 [PH] (ref 5–8)
PROT UR STRIP-MCNC: ABNORMAL MG/DL
RBC #/AREA URNS HPF: ABNORMAL /HPF (ref 0–2)
SP GR UR STRIP: 1.03 (ref 1–1.03)
UROBILINOGEN UR STRIP-ACNC: NORMAL EU/DL (ref 0–1)
WBC #/AREA URNS HPF: ABNORMAL /HPF (ref 0–5)

## 2025-01-20 PROCEDURE — 99283 EMERGENCY DEPT VISIT LOW MDM: CPT

## 2025-01-20 PROCEDURE — 81001 URINALYSIS AUTO W/SCOPE: CPT

## 2025-01-20 PROCEDURE — 6370000000 HC RX 637 (ALT 250 FOR IP): Performed by: EMERGENCY MEDICINE

## 2025-01-20 PROCEDURE — 81025 URINE PREGNANCY TEST: CPT

## 2025-01-20 RX ORDER — ONDANSETRON 4 MG/1
4 TABLET, ORALLY DISINTEGRATING ORAL ONCE
Status: COMPLETED | OUTPATIENT
Start: 2025-01-20 | End: 2025-01-20

## 2025-01-20 RX ORDER — CEPHALEXIN 500 MG/1
500 CAPSULE ORAL 3 TIMES DAILY
Qty: 21 CAPSULE | Refills: 0 | Status: SHIPPED | OUTPATIENT
Start: 2025-01-20 | End: 2025-01-27

## 2025-01-20 RX ORDER — DICYCLOMINE HYDROCHLORIDE 10 MG/1
10 CAPSULE ORAL 3 TIMES DAILY PRN
Qty: 15 CAPSULE | Refills: 0 | Status: SHIPPED | OUTPATIENT
Start: 2025-01-20 | End: 2025-01-25

## 2025-01-20 RX ADMIN — ONDANSETRON 4 MG: 4 TABLET, ORALLY DISINTEGRATING ORAL at 18:02

## 2025-01-20 ASSESSMENT — PAIN SCALES - GENERAL: PAINLEVEL_OUTOF10: 7

## 2025-01-20 ASSESSMENT — ENCOUNTER SYMPTOMS
SHORTNESS OF BREATH: 0
SORE THROAT: 0
DIARRHEA: 1
VOMITING: 0

## 2025-01-20 ASSESSMENT — LIFESTYLE VARIABLES
HOW MANY STANDARD DRINKS CONTAINING ALCOHOL DO YOU HAVE ON A TYPICAL DAY: PATIENT DOES NOT DRINK
HOW OFTEN DO YOU HAVE A DRINK CONTAINING ALCOHOL: NEVER

## 2025-01-20 ASSESSMENT — PAIN - FUNCTIONAL ASSESSMENT: PAIN_FUNCTIONAL_ASSESSMENT: 0-10

## 2025-01-20 ASSESSMENT — PAIN DESCRIPTION - DESCRIPTORS: DESCRIPTORS: ACHING;BURNING

## 2025-01-20 ASSESSMENT — PAIN DESCRIPTION - ORIENTATION: ORIENTATION: LEFT;RIGHT

## 2025-01-20 ASSESSMENT — PAIN DESCRIPTION - LOCATION: LOCATION: ABDOMEN

## 2025-01-20 NOTE — ED PROVIDER NOTES
[facility]  [address] [id number]  Phone: [phone]        [facility] EMERGENCY DEPARTMENT  EMERGENCY DEPARTMENT ENCOUNTER      Pt Name: Yarelis Farr  MRN: [id number]  Birthdate 2005  Date of evaluation: 1/20/2025  Provider: Georgi Quiros DO    CHIEF COMPLAINT       Chief Complaint   Patient presents with    Diarrhea     X3 days. No vomiting but feeling nauseous.          HISTORY OF PRESENT ILLNESS   (Location/Symptom, Timing/Onset,Context/Setting, Quality, Duration, Modifying Factors, Severity)  Note limiting factors.   Yarelis Farr is a 19 y.o. female who presents to the emergency department for the evaluation of diarrhea.  Been going on for about 3 days.  No vomiting but she has been a little bit nauseous.  No dysuria or hematuria and she does not think she is pregnant.  No blood in stool.  No sick contacts.  No recent hospitalization or antibiotics    Nursing Notes were reviewed.    REVIEW OF SYSTEMS    (2-9systems for level 4, 10 or more for level 5)     Review of Systems   Constitutional:  Negative for fever.   HENT:  Negative for sore throat.    Respiratory:  Negative for shortness of breath.    Cardiovascular:  Negative for chest pain.   Gastrointestinal:  Positive for diarrhea. Negative for vomiting.   Genitourinary:  Negative for dysuria.   Skin:  Negative for rash.   Neurological:  Negative for weakness.   All other systems reviewed and are negative.      Except asnoted above the remainder of the review of systems was reviewed and negative.       PAST MEDICAL HISTORY     Past Medical History:   Diagnosis Date    Constipation     Depression          SURGICAL HISTORY       Past Surgical History:   Procedure Laterality Date    HYMENECTOMY      TONSILLECTOMY           CURRENT MEDICATIONS     Previous Medications    DROSPIRENONE-ETHINYL ESTRADIOL (ONEYDA) 3-0.02 MG PER TABLET    Take 1 tablet by mouth daily    DULOXETINE (CYMBALTA) 20 MG EXTENDED

## 2025-01-22 ENCOUNTER — OFFICE VISIT (OUTPATIENT)
Dept: PAIN MANAGEMENT | Age: 20
End: 2025-01-22
Payer: MEDICAID

## 2025-01-22 VITALS — HEIGHT: 65 IN | BODY MASS INDEX: 18.33 KG/M2 | WEIGHT: 110 LBS

## 2025-01-22 DIAGNOSIS — M47.817 LUMBOSACRAL SPONDYLOSIS WITHOUT MYELOPATHY: Primary | ICD-10-CM

## 2025-01-22 PROCEDURE — 99212 OFFICE O/P EST SF 10 MIN: CPT | Performed by: PAIN MEDICINE

## 2025-01-22 RX ORDER — DULOXETIN HYDROCHLORIDE 30 MG/1
CAPSULE, DELAYED RELEASE ORAL
COMMUNITY
Start: 2024-12-05

## 2025-01-22 ASSESSMENT — ENCOUNTER SYMPTOMS: BACK PAIN: 1

## 2025-01-22 NOTE — PROGRESS NOTES
HPI:     Back Pain  This is a chronic problem. The current episode started more than 1 year ago. The problem occurs intermittently. The problem has been gradually improving since onset. The pain is present in the lumbar spine. The quality of the pain is described as aching. The pain does not radiate. The pain is The same all the time. The symptoms are aggravated by bending, position, twisting and standing. She has tried home exercises, bed rest, walking, chiropractic manipulation, heat, ice and NSAIDs for the symptoms.     Recent lumbar RFA with almost complete resolution of her pain.  Happy with current results.  MRI with degenerative changes.  She has seen spine surgeon.  Nothing surgical planned.    Pain ranges from a 0/10 to a 2/10 depending on activity.    Patient denies any new neurological symptoms. Nobowel or bladder incontinence, no weakness, and no falling.    Review of OARRS does not show any aberrant prescription behavior.     Past Medical History:   Diagnosis Date    Constipation     Depression        Past Surgical History:   Procedure Laterality Date    HYMENECTOMY      TONSILLECTOMY         Allergies   Allergen Reactions    Nitrofurantoin Hives     Joint swelling/hives 8 days after a course of Macrobid    Sulfa Antibiotics      EYE DROPS         Current Outpatient Medications:     DULoxetine (CYMBALTA) 30 MG extended release capsule, , Disp: , Rfl:     cephALEXin (KEFLEX) 500 MG capsule, Take 1 capsule by mouth 3 times daily for 7 days, Disp: 21 capsule, Rfl: 0    dicyclomine (BENTYL) 10 MG capsule, Take 1 capsule by mouth 3 times daily as needed (ab pain), Disp: 15 capsule, Rfl: 0    drospirenone-ethinyl estradiol (ONEYDA) 3-0.02 MG per tablet, Take 1 tablet by mouth daily, Disp: , Rfl:     QUEtiapine (SEROQUEL) 100 MG tablet, , Disp: , Rfl:     History reviewed. No pertinent family history.    Social History     Socioeconomic History    Marital status: Single     Spouse name: Not on file    Number of

## 2025-02-27 ENCOUNTER — OFFICE VISIT (OUTPATIENT)
Dept: ORTHOPEDIC SURGERY | Age: 20
End: 2025-02-27
Payer: MEDICAID

## 2025-02-27 VITALS — BODY MASS INDEX: 18.33 KG/M2 | WEIGHT: 110 LBS | HEIGHT: 65 IN | RESPIRATION RATE: 14 BRPM

## 2025-02-27 DIAGNOSIS — M54.50 CHRONIC MIDLINE LOW BACK PAIN WITHOUT SCIATICA: Primary | ICD-10-CM

## 2025-02-27 DIAGNOSIS — G89.29 CHRONIC MIDLINE LOW BACK PAIN WITHOUT SCIATICA: Primary | ICD-10-CM

## 2025-02-27 PROCEDURE — 99213 OFFICE O/P EST LOW 20 MIN: CPT | Performed by: ORTHOPAEDIC SURGERY

## 2025-02-27 NOTE — PROGRESS NOTES
Patient ID: Yarelis Farr is a 19 y.o. female    Chief Compliant:  No chief complaint on file.       Diagnostic imaging:  MRI sacrum and lumbar spine from last August reviewed some 5 1 degenerative disc disease patient did have some junction middle distal third sacral enhancement consistent with a nondisplaced sacral fracture      Assessment and Plan:  1. Chronic midline low back pain without sciatica        Low back pain no sacral pain being well-managed by pain management    Keep following with pain management    Follow up PRN    HPI:  This is a 19 y.o. female who presents to the clinic today for follow up evaluation of midline low back pain.     Acute midline low back pain     No pain relief since August. She had a fall at this time.    Review of Systems   All other systems reviewed and are negative.      Past History:    Current Outpatient Medications:     DULoxetine (CYMBALTA) 30 MG extended release capsule, , Disp: , Rfl:     dicyclomine (BENTYL) 10 MG capsule, Take 1 capsule by mouth 3 times daily as needed (ab pain), Disp: 15 capsule, Rfl: 0    drospirenone-ethinyl estradiol (ONEYDA) 3-0.02 MG per tablet, Take 1 tablet by mouth daily, Disp: , Rfl:     QUEtiapine (SEROQUEL) 100 MG tablet, , Disp: , Rfl:   Allergies   Allergen Reactions    Nitrofurantoin Hives     Joint swelling/hives 8 days after a course of Macrobid    Sulfa Antibiotics      EYE DROPS     Social History     Socioeconomic History    Marital status: Single     Spouse name: Not on file    Number of children: Not on file    Years of education: Not on file    Highest education level: Not on file   Occupational History    Not on file   Tobacco Use    Smoking status: Never     Passive exposure: Never    Smokeless tobacco: Never   Substance and Sexual Activity    Alcohol use: No    Drug use: No    Sexual activity: Never   Other Topics Concern    Not on file   Social History Narrative    Not on file     Social Determinants of Health     Financial

## 2025-03-11 ENCOUNTER — HOSPITAL ENCOUNTER (EMERGENCY)
Facility: CLINIC | Age: 20
Discharge: HOME | End: 2025-03-11
Attending: EMERGENCY MEDICINE
Payer: MEDICAID

## 2025-03-11 VITALS
DIASTOLIC BLOOD PRESSURE: 94 MMHG | SYSTOLIC BLOOD PRESSURE: 127 MMHG | TEMPERATURE: 98.4 F | WEIGHT: 110 LBS | OXYGEN SATURATION: 100 % | BODY MASS INDEX: 18.33 KG/M2 | HEART RATE: 79 BPM | RESPIRATION RATE: 18 BRPM | HEIGHT: 65 IN

## 2025-03-11 DIAGNOSIS — H65.01 NON-RECURRENT ACUTE SEROUS OTITIS MEDIA OF RIGHT EAR: Primary | ICD-10-CM

## 2025-03-11 PROCEDURE — 99283 EMERGENCY DEPT VISIT LOW MDM: CPT

## 2025-03-11 RX ORDER — BUPROPION HYDROCHLORIDE 75 MG/1
50 TABLET ORAL 2 TIMES DAILY
COMMUNITY

## 2025-03-11 RX ORDER — PREDNISONE 50 MG/1
50 TABLET ORAL DAILY
Qty: 5 TABLET | Refills: 0 | Status: SHIPPED | OUTPATIENT
Start: 2025-03-11 | End: 2025-03-16

## 2025-03-11 RX ORDER — AMOXICILLIN 875 MG/1
875 TABLET, COATED ORAL 2 TIMES DAILY
Qty: 14 TABLET | Refills: 0 | Status: SHIPPED | OUTPATIENT
Start: 2025-03-11 | End: 2025-03-18

## 2025-03-11 ASSESSMENT — PAIN - FUNCTIONAL ASSESSMENT: PAIN_FUNCTIONAL_ASSESSMENT: NONE - DENIES PAIN

## 2025-03-11 NOTE — ED PROVIDER NOTES
[facility]  [address] [address]  Phone: [phone]      Patient Name:  Yarelis Farr  Medical Record Number:  [id number]  YOB: 2005  Date of Service:  3/11/2025  Primary Care Physician:  Yamilet Stratton MD      CHIEF COMPLAINT:       Chief Complaint   Patient presents with    Ear Pain       HISTORY OF PRESENT ILLNESS:    Yarelis Farr is a 19 y.o. female who presents with the complaint of right ear pain that been going on for a few days.  She reports that last night using a at home video otoscope attempted to remove large pieces of wax from her ear.  She has had increasing pain since then.  Now has decreased hearing on that side.  Does report a ringing sensation on that side as well.  Denies any chest pain, shortness of breath, nausea, vomiting, constipation, diarrhea, lower urinary tract symptoms.    CURRENT MEDICATIONS:      Previous Medications    BUPROPION (WELLBUTRIN) 75 MG TABLET    Take 50 mg by mouth 2 times daily    DICYCLOMINE (BENTYL) 10 MG CAPSULE    Take 1 capsule by mouth 3 times daily as needed (ab pain)    DROSPIRENONE-ETHINYL ESTRADIOL (ONEYDA) 3-0.02 MG PER TABLET    Take 1 tablet by mouth daily    DULOXETINE (CYMBALTA) 30 MG EXTENDED RELEASE CAPSULE        QUETIAPINE (SEROQUEL) 100 MG TABLET           ALLERGIES:   is allergic to nitrofurantoin and sulfa antibiotics.    PAST MEDICAL HISTORY:    has a past medical history of Constipation and Depression.    SURGICAL HISTORY:      has a past surgical history that includes Hymenectomy and Tonsillectomy.    FAMILY HISTORY:   has no family status information on file.      family history is not on file.    SOCIAL HISTORY:     reports that she has never smoked. She has never been exposed to tobacco smoke. She has never used smokeless tobacco. She reports that she does not drink alcohol and does not use drugs.    IMMUNIZATION HISTORY:      There is no immunization history on file for this

## 2025-03-11 NOTE — ED PROVIDER NOTES
Attending Supervisory Note/Shared Visit   I have personally performed a face to face diagnostic evaluation on this patient. I have reviewed the mid-level’s findings and agree.      Right tympanic membrane had is an abnormal appearance with duplicated and altered light reflex.  I do not see any perforation.    (Please note that portions of this note were completed with a voice recognition program.  Efforts were made to edit the dictations but occasionally words are mis-transcribed.)    Titus Dyer MD  Attending Emergency Physician        Titus Dyer MD  03/11/25 3574

## 2025-03-18 ENCOUNTER — OFFICE VISIT (OUTPATIENT)
Dept: PAIN MANAGEMENT | Age: 20
End: 2025-03-18
Payer: MEDICAID

## 2025-03-18 VITALS — WEIGHT: 110 LBS | BODY MASS INDEX: 18.33 KG/M2 | HEIGHT: 65 IN

## 2025-03-18 DIAGNOSIS — M46.1 SACROILIITIS: ICD-10-CM

## 2025-03-18 DIAGNOSIS — M47.817 LUMBOSACRAL SPONDYLOSIS WITHOUT MYELOPATHY: Primary | ICD-10-CM

## 2025-03-18 PROCEDURE — 99214 OFFICE O/P EST MOD 30 MIN: CPT | Performed by: PAIN MEDICINE

## 2025-03-18 ASSESSMENT — ENCOUNTER SYMPTOMS: BACK PAIN: 1

## 2025-03-18 NOTE — PROGRESS NOTES
0    No family history on file.    Social History     Socioeconomic History    Marital status: Single     Spouse name: Not on file    Number of children: Not on file    Years of education: Not on file    Highest education level: Not on file   Occupational History    Not on file   Tobacco Use    Smoking status: Never     Passive exposure: Never    Smokeless tobacco: Never   Substance and Sexual Activity    Alcohol use: No    Drug use: No    Sexual activity: Never   Other Topics Concern    Not on file   Social History Narrative    Not on file     Social Drivers of Health     Financial Resource Strain: Not on file   Food Insecurity: No Food Insecurity (9/11/2024)    Received from [facility]    Hunger Screening     Within the past 12 months we worried whether our food would run out before we got money to buy more.: Never True     Within the past 12 months the food we bought just didn't last and we didn't have money to get more.: Never True   Transportation Needs: Not on file   Physical Activity: Not on file   Stress: Not on file   Social Connections: Not on file   Intimate Partner Violence: Not on file   Housing Stability: Not on file       Review of Systems:  Review of Systems   Musculoskeletal:  Positive for back pain.       Physical Exam:  Ht 1.651 m (5' 5\")   Wt 49.9 kg (110 lb)   BMI 18.30 kg/m²     Physical Exam    Constitutional:       Appearance: Normal appearance.   Pulmonary:      Effort: Pulmonary effort is normal.   Neurological:      Mental Status: Alert.   Psychiatric:         Attention and Perception: Attention and perception normal.         Mood and Affect: Mood and affect normal.   + GOOD, +Thigh thrust, +Compression       Record/Diagnostics Review:    As above    Orders:  No orders of the defined types were placed in this encounter.    No orders of the defined types were placed in this encounter.      Assessment:  1. Lumbosacral spondylosis without myelopathy    2. Sacroiliitis

## 2025-04-14 ENCOUNTER — HOSPITAL ENCOUNTER (OUTPATIENT)
Dept: PAIN MANAGEMENT | Facility: CLINIC | Age: 20
Discharge: HOME | End: 2025-04-14
Payer: MEDICAID

## 2025-04-14 VITALS
SYSTOLIC BLOOD PRESSURE: 121 MMHG | BODY MASS INDEX: 18.33 KG/M2 | HEART RATE: 79 BPM | TEMPERATURE: 97.9 F | OXYGEN SATURATION: 100 % | HEIGHT: 65 IN | WEIGHT: 110 LBS | DIASTOLIC BLOOD PRESSURE: 60 MMHG | RESPIRATION RATE: 10 BRPM

## 2025-04-14 DIAGNOSIS — R52 PAIN MANAGEMENT: ICD-10-CM

## 2025-04-14 LAB — HCG, PREGNANCY URINE (POC): NEGATIVE

## 2025-04-14 PROCEDURE — G0260 INJ FOR SACROILIAC JT ANESTH: HCPCS

## 2025-04-14 PROCEDURE — 6360000002 HC RX W HCPCS: Performed by: PAIN MEDICINE

## 2025-04-14 PROCEDURE — 81025 URINE PREGNANCY TEST: CPT

## 2025-04-14 PROCEDURE — 27096 INJECT SACROILIAC JOINT: CPT | Performed by: PAIN MEDICINE

## 2025-04-14 RX ORDER — MIDAZOLAM HYDROCHLORIDE 2 MG/2ML
INJECTION, SOLUTION INTRAMUSCULAR; INTRAVENOUS
Status: COMPLETED | OUTPATIENT
Start: 2025-04-14 | End: 2025-04-14

## 2025-04-14 RX ORDER — DEXAMETHASONE SODIUM PHOSPHATE 10 MG/ML
INJECTION, SOLUTION INTRAMUSCULAR; INTRAVENOUS
Status: COMPLETED | OUTPATIENT
Start: 2025-04-14 | End: 2025-04-14

## 2025-04-14 RX ORDER — BUPIVACAINE HYDROCHLORIDE 2.5 MG/ML
INJECTION, SOLUTION EPIDURAL; INFILTRATION; INTRACAUDAL; PERINEURAL
Status: COMPLETED | OUTPATIENT
Start: 2025-04-14 | End: 2025-04-14

## 2025-04-14 RX ORDER — LIDOCAINE HYDROCHLORIDE 5 MG/ML
INJECTION, SOLUTION INFILTRATION; INTRAVENOUS
Status: COMPLETED | OUTPATIENT
Start: 2025-04-14 | End: 2025-04-14

## 2025-04-14 RX ADMIN — MIDAZOLAM HYDROCHLORIDE 2 MG: 1 INJECTION, SOLUTION INTRAMUSCULAR; INTRAVENOUS at 13:54

## 2025-04-14 RX ADMIN — DEXAMETHASONE SODIUM PHOSPHATE 10 MG: 10 INJECTION INTRAMUSCULAR; INTRAVENOUS at 13:57

## 2025-04-14 RX ADMIN — LIDOCAINE HYDROCHLORIDE 6 ML: 5 INJECTION, SOLUTION INFILTRATION; INTRAVENOUS at 13:54

## 2025-04-14 RX ADMIN — BUPIVACAINE HYDROCHLORIDE 2.5 ML: 2.5 INJECTION, SOLUTION EPIDURAL; INFILTRATION; INTRACAUDAL; PERINEURAL at 13:57

## 2025-04-14 ASSESSMENT — PAIN - FUNCTIONAL ASSESSMENT
PAIN_FUNCTIONAL_ASSESSMENT: 0-10
PAIN_FUNCTIONAL_ASSESSMENT: 0-10

## 2025-04-14 ASSESSMENT — PAIN DESCRIPTION - DESCRIPTORS: DESCRIPTORS: ACHING

## 2025-04-14 NOTE — H&P
Pain Pre-Op H&P Note    Mattie Taylor MD    HPI: Yarelis Farr  presents with back pain, wishes to proceed with SI joint injection.    Past Medical History:   Diagnosis Date    Constipation     Depression        Past Surgical History:   Procedure Laterality Date    HYMENECTOMY      TONSILLECTOMY         History reviewed. No pertinent family history.    Allergies   Allergen Reactions    Nitrofurantoin Hives     Joint swelling/hives 8 days after a course of Macrobid    Sulfa Antibiotics      EYE DROPS         Current Outpatient Medications:     buPROPion (WELLBUTRIN) 75 MG tablet, Take 50 mg by mouth 2 times daily, Disp: , Rfl:     drospirenone-ethinyl estradiol (ONEYDA) 3-0.02 MG per tablet, Take 1 tablet by mouth daily, Disp: , Rfl:     QUEtiapine (SEROQUEL) 100 MG tablet, , Disp: , Rfl:     DULoxetine (CYMBALTA) 30 MG extended release capsule, , Disp: , Rfl:     dicyclomine (BENTYL) 10 MG capsule, Take 1 capsule by mouth 3 times daily as needed (ab pain), Disp: 15 capsule, Rfl: 0    Social History     Tobacco Use    Smoking status: Never     Passive exposure: Never    Smokeless tobacco: Never   Substance Use Topics    Alcohol use: No       Review of Systems:   Focused review of systems was performed, and negative as pertinent to diagnosis, except as stated in HPI.      Physical Exam  Constitutional:       Appearance: Normal appearance.   Pulmonary:      Effort: Pulmonary effort is normal.   Neurological:      Mental Status: alert.   Psychiatric:         Attention and Perception: Attention and perception normal.         Mood and Affect: Mood and affect normal.   Cardiovascular:      Rate: Normal rate.         ASA: 3          Mallampati: 2       Patient's current physical status, medications, medical history, and HPI have been reviewed and updated as appropriate on this date: 04/14/25    Risk/Benefit(s): The risks, benefits, alternatives, and potential complications have been discussed with the patient/family

## 2025-04-14 NOTE — DISCHARGE INSTRUCTIONS
You have received a sedative/anesthetic therefore you should not consume any alcoholic beverages for 24 hours.  Do not drive or operate machinery for 24 hours.  Do not take a tub bath for 72 hours after procedure (this includes hot tubs).  You may shower, but avoid hot water to injection site.   Avoid strenuous activity TODAY especially if you experience dizziness.   Remove band-aid the next day.    Wash off any residual iodine 24 hours from today.   Do not use heat, heating pad, or any other heating device over the injection site for 3 days after the procedure.    If you experience pain after your procedure, you may continue with your current pain medication as prescribed.  (DO NOT INCREASE YOUR PAIN MEDICATION WITHOUT TALKING TO DOCTOR)  Soreness and pain at injection site is common, may use ice to reduce soreness.    What to expect:  You may experience facial flushing, night sweats and irritability.  Other common steroid related side effects are increased appetite, mood elevation, insomnia and fluid retention.  These effects usually subside in a few days.  Steroids used in epidural injections may cause muscle spasms for a few days.  If you are diabetic, your blood sugar may be elevated after your procedure due to the steroids.  You will need to monitor your blood sugar more closely while going through a series of injections (Check blood sugar at meals and bedtime for 5 days).  You may require adjustment in your diabetic medications, contact your PCP office to discuss.    Call [facility] at [phone] if you experience:   Fever, chills or temperature over 100    Vomiting, headache, persistent stiff neck, nausea or blurred vision   Difficulty urinating or unable to urinate within 8 hours   Increase in weakness, numbness or loss of function of limbs  Increased redness, swelling or drainage at the injection site

## 2025-04-14 NOTE — OP NOTE
Sacro-Iliac Joint Injection:  SURGEON: Mattie Taylor MD    PRE-OP DIAGNOSIS: Sacroiliitis (M46.1), Low back pain (M54.5)    POST-OP DIAGNOSIS: Same.    Procedure performed: Bilateral Sacroiliac Joint Injection.    Physician confirmed and marked the surgical site.    EBL: minimal    CONSENT: Patient has undergone the educational process with this procedure, is aware and fully understands the risks involved: potential damage to any and all body organs including possible bleeding, infection, nerve injury, paralysis, allergic reaction and headache. Patient also understands that the procedure will be undertaken in a safe, controlled and monitored setting. Patient recognizes that the benefits may include relief from pain and reduction in the oral use of medications. Patient agreed to proceed.    Risks, benefits, and alternatives including postponing the procedure were discussed. The patient does wish to proceed with the procedure at this time.      PREP: The patient's back was prepped with chloroprep and draped appropriately. 0.5% lidocaine was used to anesthetize the skin and subcutaneous tissue.    PROCEDURE NOTE: 25 gauge spinal needle(s) was/were advanced to the  Bilateral SI joint under fluoroscopic guidance. Aspiration was negative. 2ml of  0.25% bupivacaine was mixed with 5mg Dexamethasone and slowly injected into the joint(s).    The needle was withdrawn by the physician and the nurse applied a sterile dressing. The patient tolerated the procedure well. No complications occurred. Patient transferred to the recovery room in satisfactory condition. Appropriate written discharge instructions given to the patient.      Mattie Taylor MD

## 2025-05-02 ENCOUNTER — OFFICE VISIT (OUTPATIENT)
Dept: PAIN MANAGEMENT | Age: 20
End: 2025-05-02
Payer: MEDICAID

## 2025-05-02 VITALS — WEIGHT: 110 LBS | HEIGHT: 65 IN | BODY MASS INDEX: 18.33 KG/M2

## 2025-05-02 DIAGNOSIS — M47.817 LUMBOSACRAL SPONDYLOSIS WITHOUT MYELOPATHY: Primary | ICD-10-CM

## 2025-05-02 PROCEDURE — 99212 OFFICE O/P EST SF 10 MIN: CPT | Performed by: PAIN MEDICINE

## 2025-05-02 ASSESSMENT — ENCOUNTER SYMPTOMS: BACK PAIN: 1

## 2025-05-02 NOTE — PROGRESS NOTES
Not on file    Highest education level: Not on file   Occupational History    Not on file   Tobacco Use    Smoking status: Never     Passive exposure: Never    Smokeless tobacco: Never   Substance and Sexual Activity    Alcohol use: No    Drug use: No    Sexual activity: Never   Other Topics Concern    Not on file   Social History Narrative    Not on file     Social Drivers of Health     Financial Resource Strain: Not on file   Food Insecurity: No Food Insecurity (4/2/2025)    Received from [facility]    Hunger Screening     Within the past 12 months we worried whether our food would run out before we got money to buy more.: Never True     Within the past 12 months the food we bought just didn't last and we didn't have money to get more.: Never True   Transportation Needs: Not on file   Physical Activity: Not on file   Stress: Not on file   Social Connections: Not on file   Intimate Partner Violence: Not on file   Housing Stability: Not on file       Review of Systems:  Review of Systems   Musculoskeletal:  Positive for back pain.       Physical Exam:  Ht 1.651 m (5' 5\")   Wt 49.9 kg (110 lb)   BMI 18.30 kg/m²     Physical Exam    Constitutional:       Appearance: Normal appearance.   Pulmonary:      Effort: Pulmonary effort is normal.   Neurological:      Mental Status: Alert.   Psychiatric:         Attention and Perception: Attention and perception normal.         Mood and Affect: Mood and affect normal.       Record/Diagnostics Review:    As above      Assessment:  1. Lumbosacral spondylosis without myelopathy        Treatment Plan:  DISCUSSION: Treatment options discussed with patient and all questions answered to patient's satisfaction.  Risks, benefits, and alternatives of treatment discussed.    OARRS Review: Reviewed  TREATMENT OPTIONS:     Discussed different treatment options including continued conservative care such as physical therapy, chiropractic care, acupuncture.  Discussed different

## 2025-05-26 ENCOUNTER — HOSPITAL ENCOUNTER (EMERGENCY)
Facility: CLINIC | Age: 20
Discharge: HOME | End: 2025-05-26
Attending: STUDENT IN AN ORGANIZED HEALTH CARE EDUCATION/TRAINING PROGRAM
Payer: MEDICAID

## 2025-05-26 ENCOUNTER — APPOINTMENT (OUTPATIENT)
Dept: GENERAL RADIOLOGY | Facility: CLINIC | Age: 20
End: 2025-05-26
Payer: MEDICAID

## 2025-05-26 VITALS
WEIGHT: 110 LBS | RESPIRATION RATE: 20 BRPM | TEMPERATURE: 98.2 F | HEIGHT: 65 IN | OXYGEN SATURATION: 100 % | HEART RATE: 91 BPM | BODY MASS INDEX: 18.33 KG/M2 | DIASTOLIC BLOOD PRESSURE: 91 MMHG | SYSTOLIC BLOOD PRESSURE: 148 MMHG

## 2025-05-26 DIAGNOSIS — S81.811A LACERATION OF RIGHT LOWER EXTREMITY, INITIAL ENCOUNTER: Primary | ICD-10-CM

## 2025-05-26 PROCEDURE — 6370000000 HC RX 637 (ALT 250 FOR IP): Performed by: STUDENT IN AN ORGANIZED HEALTH CARE EDUCATION/TRAINING PROGRAM

## 2025-05-26 PROCEDURE — 6360000002 HC RX W HCPCS: Performed by: STUDENT IN AN ORGANIZED HEALTH CARE EDUCATION/TRAINING PROGRAM

## 2025-05-26 PROCEDURE — 12001 RPR S/N/AX/GEN/TRNK 2.5CM/<: CPT

## 2025-05-26 PROCEDURE — 99284 EMERGENCY DEPT VISIT MOD MDM: CPT

## 2025-05-26 PROCEDURE — 72170 X-RAY EXAM OF PELVIS: CPT

## 2025-05-26 PROCEDURE — 90715 TDAP VACCINE 7 YRS/> IM: CPT | Performed by: STUDENT IN AN ORGANIZED HEALTH CARE EDUCATION/TRAINING PROGRAM

## 2025-05-26 PROCEDURE — 90471 IMMUNIZATION ADMIN: CPT | Performed by: STUDENT IN AN ORGANIZED HEALTH CARE EDUCATION/TRAINING PROGRAM

## 2025-05-26 RX ORDER — IBUPROFEN 800 MG/1
800 TABLET, FILM COATED ORAL ONCE
Status: COMPLETED | OUTPATIENT
Start: 2025-05-26 | End: 2025-05-26

## 2025-05-26 RX ORDER — ACETAMINOPHEN 325 MG/1
650 TABLET ORAL ONCE
Status: COMPLETED | OUTPATIENT
Start: 2025-05-26 | End: 2025-05-26

## 2025-05-26 RX ADMIN — IBUPROFEN 800 MG: 800 TABLET ORAL at 21:50

## 2025-05-26 RX ADMIN — TETANUS TOXOID, REDUCED DIPHTHERIA TOXOID AND ACELLULAR PERTUSSIS VACCINE, ADSORBED 0.5 ML: 5; 2.5; 8; 8; 2.5 SUSPENSION INTRAMUSCULAR at 21:50

## 2025-05-26 RX ADMIN — ACETAMINOPHEN 650 MG: 325 TABLET, FILM COATED ORAL at 21:50

## 2025-05-26 ASSESSMENT — PAIN SCALES - GENERAL: PAINLEVEL_OUTOF10: 6

## 2025-05-26 ASSESSMENT — PAIN - FUNCTIONAL ASSESSMENT: PAIN_FUNCTIONAL_ASSESSMENT: 0-10

## 2025-05-26 ASSESSMENT — PAIN DESCRIPTION - LOCATION: LOCATION: LEG;GROIN

## 2025-05-26 ASSESSMENT — PAIN DESCRIPTION - ORIENTATION: ORIENTATION: RIGHT

## 2025-05-26 ASSESSMENT — PAIN DESCRIPTION - DESCRIPTORS: DESCRIPTORS: SHARP

## 2025-05-27 ASSESSMENT — ENCOUNTER SYMPTOMS: BACK PAIN: 0

## 2025-05-27 NOTE — DISCHARGE INSTRUCTIONS
We evaluated you after your fall.  Your x-ray was unremarkable.  We closed your laceration with skin glue, this will fall off on its own.  Okay for showers, avoid baths until it is healed.    We updated your tetanus shot.    Follow close with your primary doctor.    Return to the emergency department if you develop any worsening or concerning symptoms.

## 2025-05-27 NOTE — ED PROVIDER NOTES
[facility]  [address] [address]  Phone: [phone]        [facility] EMERGENCY DEPARTMENT  EMERGENCY DEPARTMENT ENCOUNTER      Pt Name: Yarelis Farr  MRN: [id number]  Birthdate 2005  Date of evaluation: 5/26/2025  Provider: Kerry Olsen DO    CHIEF COMPLAINT       Chief Complaint   Patient presents with    Laceration     R side groin area       HISTORY OF PRESENT ILLNESS   (Location/Symptom, Timing/Onset,Context/Setting, Quality, Duration, Modifying Factors, Severity)  Note limiting factors.     Yarelis Farr is a 19 y.o. female who presents to the emergency department with a laceration in her right groin region.  Patient states he was climbing on the top of a wooden bookcase when it somehow broke and she fell.  Sustained a laceration to her right groin.  Patient states this happened shortly prior to arrival.  Patient states it was bleeding and scared her so she came ready to be evaluated.  Has not yet taken anything for symptomatic relief.  Patient is unsure the last time she had a tetanus shot.  Patient states she also is very concerned because she has a known fractured coccyx and is concerned she may have injured this further.  Did not fall directly on her coccyx region however is still concerned.  Has been ambulatory since the incident.  Denies any other injuries other than in her right groin.    Nursing Notes were reviewed.    REVIEW OF SYSTEMS       Review of Systems   Constitutional:  Negative for chills and fever.   Musculoskeletal:  Negative for back pain and neck pain.   Skin:  Positive for wound.       PAST MEDICAL HISTORY     Past Medical History:   Diagnosis Date    Constipation     Depression        SURGICAL HISTORY       Past Surgical History:   Procedure Laterality Date    HYMENECTOMY      TONSILLECTOMY         CURRENT MEDICATIONS     Discharge Medication List as of 5/26/2025 10:47 PM        CONTINUE these medications which have NOT  fractured coccyx, requesting x-ray imaging, will perform at patient's request.  Will provide symptomatic treatment.  Will monitor closely and reassess.    Amount and/or Complexity of Data Reviewed  Radiology: ordered. Decision-making details documented in ED Course.    Risk  OTC drugs.  Prescription drug management.        DIAGNOSTIC RESULTS     LABS:  Labs Reviewed - No data to display    All other labs were within normal range or not returned as of this dictation.    RADIOLOGY:  XR PELVIS (1-2 VIEWS)   Final Result   No evidence of acute osseous abnormality involving the pelvis. If the patient   is acutely nonweightbearing, consider CT or MRI for further evaluation.             EKG:  None      ED COURSE     ED Course as of 05/27/25 0259   Mon May 26, 2025   2157 Laceration on right upper medial thigh closed with Dermabond, patient tolerated well [AB]   2245 XR PELVIS (1-2 VIEWS)  IMPRESSION:  No evidence of acute osseous abnormality involving the pelvis. If the patient  is acutely nonweightbearing, consider CT or MRI for further evaluation.   [AB]   2245 Xray imaging grossly unremarkable, patient ambulating without difficulty.  Will plan on discharge at this time with close outpatient follow-up with primary doctor.  Instructed to keep her laceration clean and dry, the glue will fall off on its own.  Given strict return precautions.  Patient expressed understanding, agreed with plan. [AB]      ED Course User Index  [AB] Kerry Olsen DO       CONSULTS:  None    PROCEDURES:  Lac Repair    Date/Time: 5/26/2025 9:57 PM    Performed by: Kerry Olsen DO  Authorized by: Kerry Olsen DO    Consent:     Consent obtained:  Verbal    Consent given by:  Patient    Risks, benefits, and alternatives were discussed: yes      Alternatives discussed:  No treatment  Universal protocol:     Procedure explained and questions answered to patient or proxy's satisfaction: yes      Patient identity confirmed:

## 2025-05-27 NOTE — ED NOTES
Pt presents to ED ambulatory a&O x4. Pt states she was standing on a wooden bookshelf and it broke and she fell through it. This caused abrasions and lacerations to R side groin area. Bleeding is controlled at this time. Approx 2cm lac noted plus other abrasions around the area. Denies hitting head or LOC

## 2025-07-09 ENCOUNTER — OFFICE VISIT (OUTPATIENT)
Dept: PAIN MANAGEMENT | Age: 20
End: 2025-07-09
Payer: MEDICAID

## 2025-07-09 VITALS — WEIGHT: 110 LBS | HEIGHT: 65 IN | BODY MASS INDEX: 18.33 KG/M2

## 2025-07-09 DIAGNOSIS — M47.817 LUMBOSACRAL SPONDYLOSIS WITHOUT MYELOPATHY: Primary | ICD-10-CM

## 2025-07-09 DIAGNOSIS — M46.1 SACROILIITIS: ICD-10-CM

## 2025-07-09 PROCEDURE — 99214 OFFICE O/P EST MOD 30 MIN: CPT | Performed by: PAIN MEDICINE

## 2025-07-09 ASSESSMENT — ENCOUNTER SYMPTOMS: BACK PAIN: 1

## 2025-07-09 NOTE — PROGRESS NOTES
Never     Passive exposure: Never    Smokeless tobacco: Never   Substance and Sexual Activity    Alcohol use: No    Drug use: No    Sexual activity: Never   Other Topics Concern    Not on file   Social History Narrative    Not on file     Social Drivers of Health     Financial Resource Strain: Not on file   Food Insecurity: No Food Insecurity (6/3/2025)    Received from [facility]    Hunger Screening     Within the past 12 months we worried whether our food would run out before we got money to buy more.: Never True     Within the past 12 months the food we bought just didn't last and we didn't have money to get more.: Never True   Transportation Needs: Not on file   Physical Activity: Not on file   Stress: Not on file   Social Connections: Not on file   Intimate Partner Violence: Not on file   Housing Stability: Not on file       Review of Systems:  Review of Systems   Musculoskeletal:  Positive for back pain.       Physical Exam:  Ht 1.651 m (5' 5\")   Wt 49.9 kg (110 lb)   BMI 18.30 kg/m²     Physical Exam    Constitutional:       Appearance: Normal appearance.   Pulmonary:      Effort: Pulmonary effort is normal.   Neurological:      Mental Status: Alert.   Psychiatric:         Attention and Perception: Attention and perception normal.         Mood and Affect: Mood and affect normal.       Record/Diagnostics Review:    MRI L spine  IMPRESSION:  Mild degenerative disc disease at L5-S1. No significant central canal or  neural foraminal stenosis.    Orders:  Orders Placed This Encounter   Procedures    FACET JOINT L/S SINGLE    RADIOFREQUENCY L/S ADDITIONAL       Assessment:  1. Lumbosacral spondylosis without myelopathy    2. Sacroiliitis        Treatment Plan:  DISCUSSION: Treatment options discussed with patient and all questions answered to patient's satisfaction.  Risks, benefits, and alternatives of treatment discussed.    OARRS Review: Reviewed  TREATMENT OPTIONS:     Discussed different

## 2025-08-11 ENCOUNTER — HOSPITAL ENCOUNTER (OUTPATIENT)
Dept: PAIN MANAGEMENT | Facility: CLINIC | Age: 20
Discharge: HOME | End: 2025-08-11
Payer: MEDICAID

## 2025-08-11 VITALS
OXYGEN SATURATION: 100 % | RESPIRATION RATE: 12 BRPM | DIASTOLIC BLOOD PRESSURE: 82 MMHG | WEIGHT: 110 LBS | HEART RATE: 87 BPM | BODY MASS INDEX: 18.33 KG/M2 | HEIGHT: 65 IN | SYSTOLIC BLOOD PRESSURE: 129 MMHG | TEMPERATURE: 98 F

## 2025-08-11 DIAGNOSIS — R52 PAIN MANAGEMENT: ICD-10-CM

## 2025-08-11 PROBLEM — M47.817 LUMBOSACRAL SPONDYLOSIS WITHOUT MYELOPATHY: Status: ACTIVE | Noted: 2025-08-11

## 2025-08-11 LAB — HCG, PREGNANCY URINE (POC): NEGATIVE

## 2025-08-11 PROCEDURE — 6360000002 HC RX W HCPCS: Performed by: PAIN MEDICINE

## 2025-08-11 PROCEDURE — 64635 DESTROY LUMB/SAC FACET JNT: CPT | Performed by: PAIN MEDICINE

## 2025-08-11 PROCEDURE — 64636 DESTROY L/S FACET JNT ADDL: CPT

## 2025-08-11 PROCEDURE — 64636 DESTROY L/S FACET JNT ADDL: CPT | Performed by: PAIN MEDICINE

## 2025-08-11 PROCEDURE — 81025 URINE PREGNANCY TEST: CPT

## 2025-08-11 PROCEDURE — 64635 DESTROY LUMB/SAC FACET JNT: CPT

## 2025-08-11 RX ORDER — MIDAZOLAM HYDROCHLORIDE 2 MG/2ML
INJECTION, SOLUTION INTRAMUSCULAR; INTRAVENOUS
Status: COMPLETED | OUTPATIENT
Start: 2025-08-11 | End: 2025-08-11

## 2025-08-11 RX ORDER — CETIRIZINE HYDROCHLORIDE 10 MG/1
10 TABLET ORAL DAILY
COMMUNITY

## 2025-08-11 RX ORDER — LIDOCAINE HYDROCHLORIDE 5 MG/ML
INJECTION, SOLUTION INFILTRATION; INTRAVENOUS
Status: COMPLETED | OUTPATIENT
Start: 2025-08-11 | End: 2025-08-11

## 2025-08-11 RX ORDER — BUPIVACAINE HYDROCHLORIDE 2.5 MG/ML
INJECTION, SOLUTION EPIDURAL; INFILTRATION; INTRACAUDAL; PERINEURAL
Status: COMPLETED | OUTPATIENT
Start: 2025-08-11 | End: 2025-08-11

## 2025-08-11 RX ADMIN — BUPIVACAINE HYDROCHLORIDE 5 ML: 2.5 INJECTION, SOLUTION EPIDURAL; INFILTRATION; INTRACAUDAL; PERINEURAL at 11:38

## 2025-08-11 RX ADMIN — MIDAZOLAM HYDROCHLORIDE 2 MG: 1 INJECTION, SOLUTION INTRAMUSCULAR; INTRAVENOUS at 11:26

## 2025-08-11 RX ADMIN — LIDOCAINE HYDROCHLORIDE 6 ML: 5 INJECTION, SOLUTION INFILTRATION; INTRAVENOUS at 11:29

## 2025-08-11 ASSESSMENT — PAIN - FUNCTIONAL ASSESSMENT: PAIN_FUNCTIONAL_ASSESSMENT: PREVENTS OR INTERFERES SOME ACTIVE ACTIVITIES AND ADLS

## 2025-08-11 ASSESSMENT — PAIN DESCRIPTION - ORIENTATION: ORIENTATION: LOWER

## 2025-08-11 ASSESSMENT — PAIN SCALES - GENERAL
PAINLEVEL_OUTOF10: 0
PAINLEVEL_OUTOF10: 3

## 2025-08-11 ASSESSMENT — PAIN DESCRIPTION - LOCATION: LOCATION: BACK

## 2025-08-11 ASSESSMENT — PAIN DESCRIPTION - DESCRIPTORS: DESCRIPTORS: SHARP;SORE

## 2025-08-11 ASSESSMENT — PAIN DESCRIPTION - PAIN TYPE: TYPE: CHRONIC PAIN

## 2025-08-11 ASSESSMENT — PAIN DESCRIPTION - FREQUENCY: FREQUENCY: CONTINUOUS

## 2025-08-18 ENCOUNTER — HOSPITAL ENCOUNTER (OUTPATIENT)
Dept: PAIN MANAGEMENT | Facility: CLINIC | Age: 20
Discharge: HOME | End: 2025-08-18
Payer: MEDICAID

## 2025-08-18 VITALS
TEMPERATURE: 97.8 F | WEIGHT: 110 LBS | RESPIRATION RATE: 16 BRPM | HEIGHT: 65 IN | SYSTOLIC BLOOD PRESSURE: 130 MMHG | OXYGEN SATURATION: 97 % | DIASTOLIC BLOOD PRESSURE: 81 MMHG | BODY MASS INDEX: 18.33 KG/M2 | HEART RATE: 77 BPM

## 2025-08-18 DIAGNOSIS — R52 PAIN MANAGEMENT: ICD-10-CM

## 2025-08-18 LAB — HCG, PREGNANCY URINE (POC): NEGATIVE

## 2025-08-18 PROCEDURE — 81025 URINE PREGNANCY TEST: CPT

## 2025-08-18 PROCEDURE — 64636 DESTROY L/S FACET JNT ADDL: CPT

## 2025-08-18 PROCEDURE — 64636 DESTROY L/S FACET JNT ADDL: CPT | Performed by: PAIN MEDICINE

## 2025-08-18 PROCEDURE — 6360000002 HC RX W HCPCS: Performed by: PAIN MEDICINE

## 2025-08-18 PROCEDURE — 64635 DESTROY LUMB/SAC FACET JNT: CPT | Performed by: PAIN MEDICINE

## 2025-08-18 PROCEDURE — 64635 DESTROY LUMB/SAC FACET JNT: CPT

## 2025-08-18 RX ORDER — MIDAZOLAM HYDROCHLORIDE 2 MG/2ML
INJECTION, SOLUTION INTRAMUSCULAR; INTRAVENOUS
Status: COMPLETED | OUTPATIENT
Start: 2025-08-18 | End: 2025-08-18

## 2025-08-18 RX ORDER — LIDOCAINE HYDROCHLORIDE 5 MG/ML
INJECTION, SOLUTION INFILTRATION; INTRAVENOUS
Status: COMPLETED | OUTPATIENT
Start: 2025-08-18 | End: 2025-08-18

## 2025-08-18 RX ORDER — BUPIVACAINE HYDROCHLORIDE 2.5 MG/ML
INJECTION, SOLUTION EPIDURAL; INFILTRATION; INTRACAUDAL; PERINEURAL
Status: COMPLETED | OUTPATIENT
Start: 2025-08-18 | End: 2025-08-18

## 2025-08-18 RX ORDER — DROSPIRENONE AND ETHINYL ESTRADIOL 0.02-3(28)
1 KIT ORAL DAILY
COMMUNITY

## 2025-08-18 RX ADMIN — LIDOCAINE HYDROCHLORIDE 4 ML: 5 INJECTION, SOLUTION INFILTRATION; INTRAVENOUS at 11:26

## 2025-08-18 RX ADMIN — MIDAZOLAM HYDROCHLORIDE 2 MG: 1 INJECTION, SOLUTION INTRAMUSCULAR; INTRAVENOUS at 11:25

## 2025-08-18 RX ADMIN — BUPIVACAINE HYDROCHLORIDE 5 ML: 2.5 INJECTION, SOLUTION EPIDURAL; INFILTRATION; INTRACAUDAL; PERINEURAL at 11:31

## 2025-08-18 ASSESSMENT — PAIN DESCRIPTION - FREQUENCY: FREQUENCY: CONTINUOUS

## 2025-08-18 ASSESSMENT — PAIN DESCRIPTION - ORIENTATION: ORIENTATION: LOWER

## 2025-08-18 ASSESSMENT — PAIN SCALES - GENERAL
PAINLEVEL_OUTOF10: 0
PAINLEVEL_OUTOF10: 3
PAINLEVEL_OUTOF10: 0
PAINLEVEL_OUTOF10: 0

## 2025-08-18 ASSESSMENT — PAIN - FUNCTIONAL ASSESSMENT: PAIN_FUNCTIONAL_ASSESSMENT: PREVENTS OR INTERFERES SOME ACTIVE ACTIVITIES AND ADLS

## 2025-08-18 ASSESSMENT — PAIN DESCRIPTION - LOCATION: LOCATION: BACK

## 2025-08-18 ASSESSMENT — PAIN DESCRIPTION - PAIN TYPE: TYPE: CHRONIC PAIN

## 2025-08-18 ASSESSMENT — PAIN DESCRIPTION - DESCRIPTORS: DESCRIPTORS: SHARP;SORE

## 2025-09-05 ENCOUNTER — OFFICE VISIT (OUTPATIENT)
Dept: PAIN MANAGEMENT | Age: 20
End: 2025-09-05
Payer: MEDICAID

## 2025-09-05 VITALS — BODY MASS INDEX: 18.33 KG/M2 | HEIGHT: 65 IN | WEIGHT: 110 LBS

## 2025-09-05 DIAGNOSIS — M46.1 SACROILIITIS: Primary | ICD-10-CM

## 2025-09-05 DIAGNOSIS — M47.817 LUMBOSACRAL SPONDYLOSIS WITHOUT MYELOPATHY: ICD-10-CM

## 2025-09-05 PROCEDURE — 99214 OFFICE O/P EST MOD 30 MIN: CPT | Performed by: PAIN MEDICINE

## 2025-09-05 ASSESSMENT — ENCOUNTER SYMPTOMS: BACK PAIN: 1
